# Patient Record
Sex: FEMALE | Race: WHITE | Employment: FULL TIME | URBAN - METROPOLITAN AREA
[De-identification: names, ages, dates, MRNs, and addresses within clinical notes are randomized per-mention and may not be internally consistent; named-entity substitution may affect disease eponyms.]

---

## 2017-05-08 ENCOUNTER — OFFICE VISIT (OUTPATIENT)
Dept: FAMILY MEDICINE CLINIC | Age: 47
End: 2017-05-08
Payer: COMMERCIAL

## 2017-05-08 VITALS
RESPIRATION RATE: 18 BRPM | OXYGEN SATURATION: 98 % | HEIGHT: 66 IN | DIASTOLIC BLOOD PRESSURE: 80 MMHG | SYSTOLIC BLOOD PRESSURE: 155 MMHG | TEMPERATURE: 98.2 F | WEIGHT: 126 LBS | HEART RATE: 68 BPM | BODY MASS INDEX: 20.25 KG/M2

## 2017-05-08 DIAGNOSIS — R63.4 WEIGHT LOSS: ICD-10-CM

## 2017-05-08 DIAGNOSIS — R10.13 EPIGASTRIC PAIN: ICD-10-CM

## 2017-05-08 DIAGNOSIS — R53.83 FATIGUE, UNSPECIFIED TYPE: ICD-10-CM

## 2017-05-08 DIAGNOSIS — R19.7 DIARRHEA, UNSPECIFIED TYPE: Primary | ICD-10-CM

## 2017-05-08 LAB
BILIRUBIN, POC: NEGATIVE
BLOOD URINE, POC: ABNORMAL
CLARITY, POC: CLEAR
COLOR, POC: YELLOW
GLUCOSE URINE, POC: NEGATIVE
KETONES, POC: NEGATIVE
LEUKOCYTE EST, POC: NEGATIVE
NITRITE, POC: NEGATIVE
PH, POC: 5
PROTEIN, POC: NEGATIVE
SPECIFIC GRAVITY, POC: 1.01
UROBILINOGEN, POC: NORMAL

## 2017-05-08 PROCEDURE — 81003 URINALYSIS AUTO W/O SCOPE: CPT | Performed by: FAMILY MEDICINE

## 2017-05-08 PROCEDURE — 99214 OFFICE O/P EST MOD 30 MIN: CPT | Performed by: FAMILY MEDICINE

## 2017-05-08 ASSESSMENT — ENCOUNTER SYMPTOMS
EYES NEGATIVE: 1
VOMITING: 1
ABDOMINAL PAIN: 1
DIARRHEA: 1
ALLERGIC/IMMUNOLOGIC NEGATIVE: 1
BLOATING: 1
FLATUS: 0
COUGH: 1
NAUSEA: 1

## 2017-05-10 ENCOUNTER — HOSPITAL ENCOUNTER (OUTPATIENT)
Age: 47
Discharge: HOME | End: 2017-05-10
Payer: COMMERCIAL

## 2017-05-10 DIAGNOSIS — R53.83 FATIGUE, UNSPECIFIED TYPE: ICD-10-CM

## 2017-05-10 DIAGNOSIS — R63.4 WEIGHT LOSS: ICD-10-CM

## 2017-05-10 DIAGNOSIS — R19.7 DIARRHEA, UNSPECIFIED TYPE: ICD-10-CM

## 2017-05-10 DIAGNOSIS — R10.13 EPIGASTRIC PAIN: ICD-10-CM

## 2017-05-10 LAB
ABSOLUTE EOS #: 0.1 K/UL (ref 0–0.4)
ABSOLUTE LYMPH #: 1.5 K/UL (ref 1–4.8)
ABSOLUTE MONO #: 0.5 K/UL (ref 0.1–1.2)
ALBUMIN SERPL-MCNC: 4.4 G/DL (ref 3.5–5.2)
ALBUMIN/GLOBULIN RATIO: 1.6 (ref 1–2.5)
ALP BLD-CCNC: 70 U/L (ref 35–104)
ALT SERPL-CCNC: 19 U/L (ref 5–33)
AMYLASE: 65 U/L (ref 28–100)
ANION GAP SERPL CALCULATED.3IONS-SCNC: 12 MMOL/L (ref 9–17)
AST SERPL-CCNC: 19 U/L
BASOPHILS # BLD: 0 %
BASOPHILS ABSOLUTE: 0 K/UL (ref 0–0.2)
BILIRUB SERPL-MCNC: 0.53 MG/DL (ref 0.3–1.2)
BUN BLDV-MCNC: 9 MG/DL (ref 6–20)
BUN/CREAT BLD: NORMAL (ref 9–20)
CALCIUM SERPL-MCNC: 9.3 MG/DL (ref 8.6–10.4)
CHLORIDE BLD-SCNC: 103 MMOL/L (ref 98–107)
CHOLESTEROL/HDL RATIO: 2.7
CHOLESTEROL: 155 MG/DL
CO2: 26 MMOL/L (ref 20–31)
CREAT SERPL-MCNC: 0.66 MG/DL (ref 0.5–0.9)
DIFFERENTIAL TYPE: NORMAL
EKG ATRIAL RATE: 54 BPM
EKG P AXIS: 79 DEGREES
EKG P-R INTERVAL: 148 MS
EKG Q-T INTERVAL: 410 MS
EKG QRS DURATION: 86 MS
EKG QTC CALCULATION (BAZETT): 388 MS
EKG R AXIS: 77 DEGREES
EKG T AXIS: 60 DEGREES
EKG VENTRICULAR RATE: 54 BPM
EOSINOPHILS RELATIVE PERCENT: 2 %
FERRITIN: 32 UG/L (ref 13–150)
FOLATE: 17.9 NG/ML
GFR AFRICAN AMERICAN: >60 ML/MIN
GFR NON-AFRICAN AMERICAN: >60 ML/MIN
GFR SERPL CREATININE-BSD FRML MDRD: NORMAL ML/MIN/{1.73_M2}
GFR SERPL CREATININE-BSD FRML MDRD: NORMAL ML/MIN/{1.73_M2}
GLUCOSE BLD-MCNC: 97 MG/DL (ref 70–99)
HCT VFR BLD CALC: 43.9 % (ref 36–46)
HDLC SERPL-MCNC: 57 MG/DL
HEMOGLOBIN: 15 G/DL (ref 12–16)
IRON SATURATION: 38 % (ref 20–55)
IRON: 113 UG/DL (ref 37–145)
LDL CHOLESTEROL: 87 MG/DL (ref 0–130)
LIPASE: 38 U/L (ref 13–60)
LYMPHOCYTES # BLD: 18 %
MCH RBC QN AUTO: 32.5 PG (ref 26–34)
MCHC RBC AUTO-ENTMCNC: 34.2 G/DL (ref 31–37)
MCV RBC AUTO: 94.8 FL (ref 80–100)
MONOCYTES # BLD: 6 %
PDW BLD-RTO: 13.4 % (ref 12.5–15.4)
PLATELET # BLD: 315 K/UL (ref 140–450)
PLATELET ESTIMATE: NORMAL
PMV BLD AUTO: 7.2 FL (ref 6–12)
POTASSIUM SERPL-SCNC: 4.5 MMOL/L (ref 3.7–5.3)
RBC # BLD: 4.63 M/UL (ref 4–5.2)
RBC # BLD: NORMAL 10*6/UL
SEG NEUTROPHILS: 74 %
SEGMENTED NEUTROPHILS ABSOLUTE COUNT: 6.5 K/UL (ref 1.8–7.7)
SODIUM BLD-SCNC: 141 MMOL/L (ref 135–144)
TOTAL IRON BINDING CAPACITY: 301 UG/DL (ref 250–450)
TOTAL PROTEIN: 7.2 G/DL (ref 6.4–8.3)
TRIGL SERPL-MCNC: 55 MG/DL
TSH SERPL DL<=0.05 MIU/L-ACNC: 1.91 MIU/L (ref 0.3–5)
UNSATURATED IRON BINDING CAPACITY: 188 UG/DL (ref 112–347)
VITAMIN B-12: 476 PG/ML (ref 211–946)
VLDLC SERPL CALC-MCNC: NORMAL MG/DL (ref 1–30)
WBC # BLD: 8.7 K/UL (ref 3.5–11)
WBC # BLD: NORMAL 10*3/UL

## 2017-05-10 PROCEDURE — 82728 ASSAY OF FERRITIN: CPT

## 2017-05-10 PROCEDURE — 82150 ASSAY OF AMYLASE: CPT

## 2017-05-10 PROCEDURE — 82607 VITAMIN B-12: CPT

## 2017-05-10 PROCEDURE — 83690 ASSAY OF LIPASE: CPT

## 2017-05-10 PROCEDURE — 80061 LIPID PANEL: CPT

## 2017-05-10 PROCEDURE — 82746 ASSAY OF FOLIC ACID SERUM: CPT

## 2017-05-10 PROCEDURE — 83550 IRON BINDING TEST: CPT

## 2017-05-10 PROCEDURE — 83540 ASSAY OF IRON: CPT

## 2017-05-10 PROCEDURE — 85025 COMPLETE CBC W/AUTO DIFF WBC: CPT

## 2017-05-10 PROCEDURE — 36415 COLL VENOUS BLD VENIPUNCTURE: CPT

## 2017-05-10 PROCEDURE — 93005 ELECTROCARDIOGRAM TRACING: CPT

## 2017-05-10 PROCEDURE — 80053 COMPREHEN METABOLIC PANEL: CPT

## 2017-05-10 PROCEDURE — 84443 ASSAY THYROID STIM HORMONE: CPT

## 2017-05-11 ENCOUNTER — HOSPITAL ENCOUNTER (OUTPATIENT)
Age: 47
Discharge: HOME | End: 2017-05-11
Payer: COMMERCIAL

## 2017-05-11 LAB
CAMPYLOBACTER PCR: NORMAL
SALMONELLA PCR: NORMAL
SHIGATOXIN GENE PCR: NORMAL
SHIGELLA SP PCR: NORMAL
SPECIMEN: NORMAL

## 2017-05-11 PROCEDURE — 87505 NFCT AGENT DETECTION GI: CPT

## 2019-08-20 ENCOUNTER — HOSPITAL ENCOUNTER (EMERGENCY)
Age: 49
Discharge: HOME | End: 2019-08-20
Attending: EMERGENCY MEDICINE
Payer: COMMERCIAL

## 2019-08-20 ENCOUNTER — APPOINTMENT (OUTPATIENT)
Dept: CT IMAGING | Age: 49
End: 2019-08-20
Payer: COMMERCIAL

## 2019-08-20 VITALS
TEMPERATURE: 97.3 F | SYSTOLIC BLOOD PRESSURE: 144 MMHG | DIASTOLIC BLOOD PRESSURE: 80 MMHG | RESPIRATION RATE: 18 BRPM | OXYGEN SATURATION: 98 % | HEART RATE: 75 BPM
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
## 2019-08-20 DIAGNOSIS — S16.1XXA STRAIN OF NECK MUSCLE, INITIAL ENCOUNTER: Primary | ICD-10-CM

## 2019-08-20 PROCEDURE — 72125 CT NECK SPINE W/O DYE: CPT

## 2019-08-20 PROCEDURE — 99284 EMERGENCY DEPT VISIT MOD MDM: CPT

## 2019-08-20 PROCEDURE — 70450 CT HEAD/BRAIN W/O DYE: CPT

## 2019-08-20 PROCEDURE — 6370000000 HC RX 637 (ALT 250 FOR IP): Performed by: STUDENT IN AN ORGANIZED HEALTH CARE EDUCATION/TRAINING PROGRAM

## 2019-08-20 RX ORDER — CYCLOBENZAPRINE HCL 10 MG
10 TABLET ORAL ONCE
Status: DISCONTINUED | OUTPATIENT
Start: 2019-08-20 | End: 2019-08-20 | Stop reason: HOSPADM

## 2019-08-20 RX ORDER — CYCLOBENZAPRINE HCL 5 MG
5 TABLET ORAL 2 TIMES DAILY PRN
Qty: 10 TABLET | Refills: 0 | Status: SHIPPED | OUTPATIENT
Start: 2019-08-20 | End: 2019-08-30

## 2019-08-20 ASSESSMENT — PAIN DESCRIPTION - FREQUENCY: FREQUENCY: CONTINUOUS

## 2019-08-20 ASSESSMENT — PAIN DESCRIPTION - LOCATION: LOCATION: LEG

## 2019-08-20 ASSESSMENT — ENCOUNTER SYMPTOMS
VOMITING: 0
SHORTNESS OF BREATH: 0
NAUSEA: 0
CHEST TIGHTNESS: 0
ABDOMINAL PAIN: 0

## 2019-08-20 ASSESSMENT — PAIN SCALES - GENERAL: PAINLEVEL_OUTOF10: 3

## 2019-08-20 ASSESSMENT — PAIN DESCRIPTION - DESCRIPTORS: DESCRIPTORS: ACHING

## 2019-08-20 ASSESSMENT — PAIN DESCRIPTION - ORIENTATION: ORIENTATION: RIGHT;LEFT

## 2019-08-20 NOTE — ED PROVIDER NOTES
significant for [place] Rendu. On exam she is uncomfortable, hypertensive, afebrile. GCS is 15. Neck has limited range of movement and has midline tenderness as well as paraspinous tenderness. Motor strength 5/5 in the upper extremities. Chest nontender. Abdomen soft nontender. Pelvis nontender. Full range of motion the hip. No bony tenderness to the femur knee or lower extremity. There is minimal tenderness of the right lateral hamstring. Impression is acute cervical strain rule out cervical injury, posttraumatic headache, right thigh pain muscular in etiology. Plan a CT head, CT neck, muscle relaxants, Ace wrap, ice, simple analgesics, routine follow-up if imaging is negative. Mert Graves.  Yenni Pastrana MD, [facility]  Attending Emergency  Physician                Yasmine Jean MD  08/20/19 1229    MIPS 12     A head CT was ordered, but not by an emergency care provider: Yes    A head CT was ordered by an emergency care provider, and some of the indications for ordering the head CT included  Measure Exclusions:  Patient has a ventricular shunt: No  Patient has a brain tumor: No  Patient has multi-system trauma: Yes  Patient is pregnant: No  Patient is taking an antiplatelet medication (excluding aspirin): No; [place]endu  Patient is 72years old or older: No    Signs and Symptoms:  Patients GCS was less than 15: No  Focal neurological deficit: No  Severe Headache: Yes  Vomiting: No  Physical signs of a basilar skull fracture: No  Coagulopathy: No  Thrombocytopenia: No  Patient suspected of taking an anticoagulant medication: No  Dangerous mechanism of injury: Yes           Yasmine Jean MD  08/20/19 0006

## 2020-05-28 ENCOUNTER — APPOINTMENT (OUTPATIENT)
Dept: CT IMAGING | Age: 50
End: 2020-05-28
Payer: COMMERCIAL

## 2020-05-28 ENCOUNTER — HOSPITAL ENCOUNTER (OUTPATIENT)
Age: 50
Setting detail: OBSERVATION
Discharge: HOME | End: 2020-05-29
Attending: EMERGENCY MEDICINE | Admitting: EMERGENCY MEDICINE
Payer: COMMERCIAL

## 2020-05-28 PROBLEM — R10.9 INTRACTABLE ABDOMINAL PAIN: Status: ACTIVE | Noted: 2020-05-28

## 2020-05-28 LAB
ABSOLUTE EOS #: 0.17 K/UL (ref 0–0.44)
ABSOLUTE IMMATURE GRANULOCYTE: <0.03 K/UL (ref 0–0.3)
ABSOLUTE LYMPH #: 1.1 K/UL (ref 1.1–3.7)
ABSOLUTE MONO #: 0.25 K/UL (ref 0.1–1.2)
ALBUMIN SERPL-MCNC: 4.8 G/DL (ref 3.5–5.2)
ALBUMIN/GLOBULIN RATIO: 1.7 (ref 1–2.5)
ALP BLD-CCNC: 60 U/L (ref 35–104)
ALT SERPL-CCNC: 9 U/L (ref 5–33)
ANION GAP SERPL CALCULATED.3IONS-SCNC: 15 MMOL/L (ref 9–17)
AST SERPL-CCNC: 19 U/L
BASOPHILS # BLD: 1 % (ref 0–2)
BASOPHILS ABSOLUTE: 0.05 K/UL (ref 0–0.2)
BILIRUB SERPL-MCNC: 0.67 MG/DL (ref 0.3–1.2)
BILIRUBIN URINE: NEGATIVE
BUN BLDV-MCNC: 6 MG/DL (ref 6–20)
BUN/CREAT BLD: ABNORMAL (ref 9–20)
CALCIUM SERPL-MCNC: 9.9 MG/DL (ref 8.6–10.4)
CHLORIDE BLD-SCNC: 103 MMOL/L (ref 98–107)
CO2: 22 MMOL/L (ref 20–31)
COLOR: YELLOW
COMMENT UA: ABNORMAL
CREAT SERPL-MCNC: 0.65 MG/DL (ref 0.5–0.9)
DIFFERENTIAL TYPE: ABNORMAL
EOSINOPHILS RELATIVE PERCENT: 2 % (ref 1–4)
GFR AFRICAN AMERICAN: >60 ML/MIN
GFR NON-AFRICAN AMERICAN: >60 ML/MIN
GFR SERPL CREATININE-BSD FRML MDRD: ABNORMAL ML/MIN/{1.73_M2}
GFR SERPL CREATININE-BSD FRML MDRD: ABNORMAL ML/MIN/{1.73_M2}
GLUCOSE BLD-MCNC: 101 MG/DL (ref 65–105)
GLUCOSE BLD-MCNC: 120 MG/DL (ref 70–99)
GLUCOSE URINE: NEGATIVE
HCG QUALITATIVE: NEGATIVE
HCT VFR BLD CALC: 46.7 % (ref 36.3–47.1)
HEMOGLOBIN: 15.7 G/DL (ref 11.9–15.1)
IMMATURE GRANULOCYTES: 0 %
KETONES, URINE: ABNORMAL
LEUKOCYTE ESTERASE, URINE: NEGATIVE
LYMPHOCYTES # BLD: 15 % (ref 24–43)
MCH RBC QN AUTO: 32.8 PG (ref 25.2–33.5)
MCHC RBC AUTO-ENTMCNC: 33.6 G/DL (ref 28.4–34.8)
MCV RBC AUTO: 97.7 FL (ref 82.6–102.9)
MONOCYTES # BLD: 3 % (ref 3–12)
NITRITE, URINE: NEGATIVE
NRBC AUTOMATED: 0 PER 100 WBC
PDW BLD-RTO: 12.7 % (ref 11.8–14.4)
PH UA: >9 (ref 5–8)
PLATELET # BLD: 356 K/UL (ref 138–453)
PLATELET ESTIMATE: ABNORMAL
PMV BLD AUTO: 9 FL (ref 8.1–13.5)
POTASSIUM SERPL-SCNC: 3.8 MMOL/L (ref 3.7–5.3)
PROTEIN UA: NEGATIVE
RBC # BLD: 4.78 M/UL (ref 3.95–5.11)
RBC # BLD: ABNORMAL 10*6/UL
SEG NEUTROPHILS: 79 % (ref 36–65)
SEGMENTED NEUTROPHILS ABSOLUTE COUNT: 5.91 K/UL (ref 1.5–8.1)
SODIUM BLD-SCNC: 140 MMOL/L (ref 135–144)
SPECIFIC GRAVITY UA: 1.05 (ref 1–1.03)
TOTAL PROTEIN: 7.6 G/DL (ref 6.4–8.3)
TURBIDITY: CLEAR
URINE HGB: NEGATIVE
UROBILINOGEN, URINE: NORMAL
WBC # BLD: 7.5 K/UL (ref 3.5–11.3)
WBC # BLD: ABNORMAL 10*3/UL

## 2020-05-28 PROCEDURE — 2580000003 HC RX 258

## 2020-05-28 PROCEDURE — 85025 COMPLETE CBC W/AUTO DIFF WBC: CPT

## 2020-05-28 PROCEDURE — 96361 HYDRATE IV INFUSION ADD-ON: CPT

## 2020-05-28 PROCEDURE — G0378 HOSPITAL OBSERVATION PER HR: HCPCS

## 2020-05-28 PROCEDURE — 99244 OFF/OP CNSLTJ NEW/EST MOD 40: CPT | Performed by: INTERNAL MEDICINE

## 2020-05-28 PROCEDURE — 96372 THER/PROPH/DIAG INJ SC/IM: CPT

## 2020-05-28 PROCEDURE — 96376 TX/PRO/DX INJ SAME DRUG ADON: CPT

## 2020-05-28 PROCEDURE — 96374 THER/PROPH/DIAG INJ IV PUSH: CPT

## 2020-05-28 PROCEDURE — 6360000002 HC RX W HCPCS: Performed by: EMERGENCY MEDICINE

## 2020-05-28 PROCEDURE — 2580000003 HC RX 258: Performed by: EMERGENCY MEDICINE

## 2020-05-28 PROCEDURE — 74177 CT ABD & PELVIS W/CONTRAST: CPT

## 2020-05-28 PROCEDURE — 84703 CHORIONIC GONADOTROPIN ASSAY: CPT

## 2020-05-28 PROCEDURE — 99285 EMERGENCY DEPT VISIT HI MDM: CPT

## 2020-05-28 PROCEDURE — 81003 URINALYSIS AUTO W/O SCOPE: CPT

## 2020-05-28 PROCEDURE — 80053 COMPREHEN METABOLIC PANEL: CPT

## 2020-05-28 PROCEDURE — 6360000002 HC RX W HCPCS

## 2020-05-28 PROCEDURE — 6360000004 HC RX CONTRAST MEDICATION: Performed by: EMERGENCY MEDICINE

## 2020-05-28 PROCEDURE — 82947 ASSAY GLUCOSE BLOOD QUANT: CPT

## 2020-05-28 RX ORDER — FENTANYL CITRATE 50 UG/ML
25 INJECTION, SOLUTION INTRAMUSCULAR; INTRAVENOUS
Status: DISCONTINUED | OUTPATIENT
Start: 2020-05-28 | End: 2020-05-28

## 2020-05-28 RX ORDER — DEXTROSE AND SODIUM CHLORIDE 5; .9 G/100ML; G/100ML
INJECTION, SOLUTION INTRAVENOUS
Status: DISCONTINUED
Start: 2020-05-28 | End: 2020-05-29

## 2020-05-28 RX ORDER — PROMETHAZINE HYDROCHLORIDE 25 MG/ML
12.5 INJECTION, SOLUTION INTRAMUSCULAR; INTRAVENOUS EVERY 8 HOURS PRN
Status: DISCONTINUED | OUTPATIENT
Start: 2020-05-28 | End: 2020-05-28

## 2020-05-28 RX ORDER — SODIUM CHLORIDE 0.9 % (FLUSH) 0.9 %
10 SYRINGE (ML) INJECTION PRN
Status: DISCONTINUED | OUTPATIENT
Start: 2020-05-28 | End: 2020-05-29 | Stop reason: HOSPADM

## 2020-05-28 RX ORDER — ACETAMINOPHEN 325 MG/1
650 TABLET ORAL EVERY 4 HOURS PRN
Status: DISCONTINUED | OUTPATIENT
Start: 2020-05-28 | End: 2020-05-29 | Stop reason: HOSPADM

## 2020-05-28 RX ORDER — SODIUM CHLORIDE 0.9 % (FLUSH) 0.9 %
10 SYRINGE (ML) INJECTION EVERY 12 HOURS SCHEDULED
Status: DISCONTINUED | OUTPATIENT
Start: 2020-05-28 | End: 2020-05-29 | Stop reason: HOSPADM

## 2020-05-28 RX ORDER — FENTANYL CITRATE 50 UG/ML
50 INJECTION, SOLUTION INTRAMUSCULAR; INTRAVENOUS
Status: DISCONTINUED | OUTPATIENT
Start: 2020-05-28 | End: 2020-05-28

## 2020-05-28 RX ORDER — HALOPERIDOL 5 MG/ML
5 INJECTION INTRAMUSCULAR ONCE
Status: COMPLETED | OUTPATIENT
Start: 2020-05-28 | End: 2020-05-28

## 2020-05-28 RX ORDER — 0.9 % SODIUM CHLORIDE 0.9 %
1000 INTRAVENOUS SOLUTION INTRAVENOUS ONCE
Status: COMPLETED | OUTPATIENT
Start: 2020-05-28 | End: 2020-05-28

## 2020-05-28 RX ORDER — ONDANSETRON 2 MG/ML
4 INJECTION INTRAMUSCULAR; INTRAVENOUS ONCE
Status: COMPLETED | OUTPATIENT
Start: 2020-05-28 | End: 2020-05-28

## 2020-05-28 RX ORDER — PROMETHAZINE HYDROCHLORIDE 25 MG/ML
12.5 INJECTION, SOLUTION INTRAMUSCULAR; INTRAVENOUS EVERY 8 HOURS PRN
Status: DISCONTINUED | OUTPATIENT
Start: 2020-05-28 | End: 2020-05-29 | Stop reason: HOSPADM

## 2020-05-28 RX ORDER — CAPSAICIN 0.025 %
CREAM (GRAM) TOPICAL 2 TIMES DAILY PRN
Status: DISCONTINUED | OUTPATIENT
Start: 2020-05-28 | End: 2020-05-29 | Stop reason: HOSPADM

## 2020-05-28 RX ORDER — ONDANSETRON 2 MG/ML
INJECTION INTRAMUSCULAR; INTRAVENOUS
Status: COMPLETED
Start: 2020-05-28 | End: 2020-05-28

## 2020-05-28 RX ORDER — MORPHINE SULFATE 2 MG/ML
2 INJECTION, SOLUTION INTRAMUSCULAR; INTRAVENOUS
Status: DISCONTINUED | OUTPATIENT
Start: 2020-05-28 | End: 2020-05-29

## 2020-05-28 RX ORDER — ONDANSETRON 2 MG/ML
4 INJECTION INTRAMUSCULAR; INTRAVENOUS EVERY 8 HOURS PRN
Status: DISCONTINUED | OUTPATIENT
Start: 2020-05-28 | End: 2020-05-29 | Stop reason: HOSPADM

## 2020-05-28 RX ADMIN — SODIUM CHLORIDE 1000 ML: 9 INJECTION, SOLUTION INTRAVENOUS at 11:13

## 2020-05-28 RX ADMIN — ONDANSETRON 4 MG: 2 INJECTION INTRAMUSCULAR; INTRAVENOUS at 11:13

## 2020-05-28 RX ADMIN — IOHEXOL 75 ML: 350 INJECTION, SOLUTION INTRAVENOUS at 12:28

## 2020-05-28 RX ADMIN — DEXTROSE AND SODIUM CHLORIDE: 5; 900 INJECTION, SOLUTION INTRAVENOUS at 15:20

## 2020-05-28 RX ADMIN — ONDANSETRON 4 MG: 2 INJECTION INTRAMUSCULAR; INTRAVENOUS at 11:48

## 2020-05-28 RX ADMIN — HALOPERIDOL LACTATE 5 MG: 5 INJECTION, SOLUTION INTRAMUSCULAR at 12:02

## 2020-05-28 ASSESSMENT — PAIN DESCRIPTION - PAIN TYPE
TYPE: ACUTE PAIN
TYPE: ACUTE PAIN

## 2020-05-28 ASSESSMENT — ENCOUNTER SYMPTOMS
SHORTNESS OF BREATH: 0
COUGH: 0
RHINORRHEA: 0
ABDOMINAL PAIN: 1
BACK PAIN: 0
EYE REDNESS: 0
NAUSEA: 1
SORE THROAT: 0
VOMITING: 1
EYE PAIN: 0
DIARRHEA: 1

## 2020-05-28 ASSESSMENT — PAIN DESCRIPTION - FREQUENCY: FREQUENCY: INTERMITTENT

## 2020-05-28 ASSESSMENT — PAIN DESCRIPTION - ORIENTATION
ORIENTATION: RIGHT;MID
ORIENTATION: RIGHT;MID;LOWER

## 2020-05-28 ASSESSMENT — PAIN DESCRIPTION - LOCATION
LOCATION: ABDOMEN
LOCATION: ABDOMEN

## 2020-05-28 ASSESSMENT — PAIN SCALES - GENERAL
PAINLEVEL_OUTOF10: 2
PAINLEVEL_OUTOF10: 5

## 2020-05-28 ASSESSMENT — PAIN DESCRIPTION - DESCRIPTORS: DESCRIPTORS: DISCOMFORT;ACHING

## 2020-05-28 NOTE — ED NOTES
Pt. Arrived to the ED c/o ongoing intermittent  N/V/D and RLQ pain since Feb. Patients intermittently smokes Marijuana. Denies ETOH, fever, vaginal, or urinary complaints. Pt. Placed on continuous monitoring.       Karthik Petersen RN  05/28/20 [address], RN  05/28/20 1535

## 2020-05-28 NOTE — CARE COORDINATION
Case Management Initial Discharge Plan  Ben Cunningham,             Met with:patient to discuss discharge plans. Information verified: address, contacts, phone number, , insurance Yes  Emergency Contact/Next of Kin name & number:   PCP: No primary care provider on file. Date of last visit: provided [phone], to find a MD accepting new patients. Insurance Provider: KASH    Discharge Planning    Living Arrangements:  Spouse/Significant Other   Support Systems:  Spouse/Significant Other    Home has 2 stories  5 stairs to climb to get into front door, 1 flight stairs to climb to reach second floor  Location of bedroom/bathroom in home bath and bedroom on the 1st and 2nd floor. Patient able to perform ADL's:Independent    Current Services (outpatient & in home) none  DME equipment: none  DME provider:       Potential Assistance Needed:  N/A    Patient agreeable to home care: No  [place] of choice provided:  n/a    Prior SNF/Rehab Placement and Facility:   Agreeable to SNF/Rehab: No  [place] of choice provided: n/a   Evaluation: no    Expected Discharge date:  20  Patient expects to be discharged to:  return to home  Follow Up Appointment: Best Day/ Time:      Transportation provider: self  Transportation arrangements needed for discharge: No,  will transport. Readmission Risk              Risk of Unplanned Readmission:        0             Does patient have a readmission risk score greater than 14?: not calculated. If yes, follow-up appointment must be made within 7 days of discharge. Goals of Care: decreased nausea.       Discharge Plan: return to home, no needs identified          Electronically signed by Haim Sharma RN on 20 at 6:05 PM EDT

## 2020-05-28 NOTE — ED PROVIDER NOTES
I did not see, evaluate, or staff this patient. Patient seen by Dr. Aries Khan.        Rome Demarco MD  05/28/20 7391
[facility]  Emergency Department  Emergency Medicine Resident Sign-out     Care of Kati Lara was assumed from Dr. Isidoro Issa and is being seen for Emesis (ongoing intermittent N/V/D and RLQ pain. Denies urinary/vaginal complaints. Denies cough, fever/chills, no chest pain/SOB)  . The patient's initial evaluation and plan have been discussed with the prior provider who initially evaluated the patient.      EMERGENCY DEPARTMENT COURSE / MEDICAL DECISION MAKING:       MEDICATIONS GIVEN:  Orders Placed This Encounter   Medications    ondansetron (ZOFRAN) 4 MG/2ML injection     SHAHEEN ELON: cabinet override    ondansetron (ZOFRAN) injection 4 mg    0.9 % sodium chloride bolus    ondansetron (ZOFRAN) injection 4 mg    haloperidol lactate (HALDOL) injection 5 mg    iohexol (OMNIPAQUE 350) solution 75 mL    dextrose 5 % and 0.9 % NaCl 1,000 mL infusion    dextrose 5 % and 0.9 % NaCl 5-0.9 % infusion     SHAHEEN LEON: cabinet override    sodium chloride flush 0.9 % injection 10 mL    sodium chloride flush 0.9 % injection 10 mL    acetaminophen (TYLENOL) tablet 650 mg    enoxaparin (LOVENOX) injection 40 mg    DISCONTD: fentaNYL (SUBLIMAZE) injection 25 mcg    DISCONTD: fentaNYL (SUBLIMAZE) injection 50 mcg    ondansetron (ZOFRAN) injection 4 mg    DISCONTD: promethazine (PHENERGAN) injection 12.5 mg    capsaicin (ZOSTRIX) 0.025 % cream    promethazine (PHENERGAN) injection 12.5 mg    morphine (PF) injection 2 mg       LABS / RADIOLOGY:     Labs Reviewed   CBC WITH AUTO DIFFERENTIAL - Abnormal; Notable for the following components:       Result Value    Hemoglobin 15.7 (*)     Seg Neutrophils 79 (*)     Lymphocytes 15 (*)     All other components within normal limits   COMPREHENSIVE METABOLIC PANEL W/ REFLEX TO MG FOR LOW K - Abnormal; Notable for the following components:    Glucose 120 (*)     All other components within normal limits   URINALYSIS - Abnormal; Notable for the following
possible appendicitis. CRITICAL CARE:       PROCEDURES:    Procedures    DIAGNOSTIC RESULTS   EKG:All EKG's are interpreted by the Emergency Department Physician who either signs or Co-signs this chart in the absence of a cardiologist.      RADIOLOGY:All plain film, CT, MRI, and formal ultrasound images (except ED bedside ultrasound) are read by the radiologist, see reports below, unless otherwisenoted in MDM or here. CT ABDOMEN PELVIS W IV CONTRAST   Preliminary Result   Equivocal findings of early acute appendicitis with minimal inflammatory   changes in the right lower quadrant but an air-filled appendix. Please   correlate with physical exam and lab values. LABS: All lab results were reviewed by myself, and all abnormals are listed below.   Labs Reviewed   CBC WITH AUTO DIFFERENTIAL - Abnormal; Notable for the following components:       Result Value    Hemoglobin 15.7 (*)     Seg Neutrophils 79 (*)     Lymphocytes 15 (*)     All other components within normal limits   COMPREHENSIVE METABOLIC PANEL W/ REFLEX TO MG FOR LOW K - Abnormal; Notable for the following components:    Glucose 120 (*)     All other components within normal limits   URINALYSIS - Abnormal; Notable for the following components:    Ketones, Urine MODERATE (*)     Specific Gravity, UA 1.055 (*)     pH, UA >9.0 (*)     All other components within normal limits   HCG, SERUM, QUALITATIVE     EMERGENCY DEPARTMENTCOURSE:   Vitals:    Vitals:    05/28/20 1057 05/28/20 1216 05/28/20 1301 05/28/20 1340   BP: 128/71 134/71 138/74    Pulse: 58   70   Resp: 17      Temp:       TempSrc:       SpO2: 100% 99% 98%    Weight: 135 lb (61.2 kg)          The patient was given the following medications while in the emergency department:  Orders Placed This Encounter   Medications    ondansetron (ZOFRAN) 4 MG/2ML injection     SHAHEEN LEON: cabinet override    ondansetron (ZOFRAN) injection 4 mg    0.9 % sodium chloride bolus    ondansetron

## 2020-05-29 VITALS
WEIGHT: 137.7 LBS | TEMPERATURE: 99 F | HEIGHT: 66 IN | SYSTOLIC BLOOD PRESSURE: 120 MMHG | DIASTOLIC BLOOD PRESSURE: 72 MMHG | OXYGEN SATURATION: 98 % | HEART RATE: 64 BPM | BODY MASS INDEX: 22.13 KG/M2 | RESPIRATION RATE: 16 BRPM

## 2020-05-29 LAB
ABSOLUTE EOS #: 0.14 K/UL (ref 0–0.44)
ABSOLUTE IMMATURE GRANULOCYTE: 0.03 K/UL (ref 0–0.3)
ABSOLUTE LYMPH #: 1.38 K/UL (ref 1.1–3.7)
ABSOLUTE MONO #: 0.52 K/UL (ref 0.1–1.2)
ANION GAP SERPL CALCULATED.3IONS-SCNC: 14 MMOL/L (ref 9–17)
BASOPHILS # BLD: 0 % (ref 0–2)
BASOPHILS ABSOLUTE: 0.04 K/UL (ref 0–0.2)
BUN BLDV-MCNC: 6 MG/DL (ref 6–20)
BUN/CREAT BLD: ABNORMAL (ref 9–20)
CALCIUM SERPL-MCNC: 8.4 MG/DL (ref 8.6–10.4)
CHLORIDE BLD-SCNC: 109 MMOL/L (ref 98–107)
CO2: 19 MMOL/L (ref 20–31)
CREAT SERPL-MCNC: 0.57 MG/DL (ref 0.5–0.9)
DIFFERENTIAL TYPE: ABNORMAL
EOSINOPHILS RELATIVE PERCENT: 1 % (ref 1–4)
GFR AFRICAN AMERICAN: >60 ML/MIN
GFR NON-AFRICAN AMERICAN: >60 ML/MIN
GFR SERPL CREATININE-BSD FRML MDRD: ABNORMAL ML/MIN/{1.73_M2}
GFR SERPL CREATININE-BSD FRML MDRD: ABNORMAL ML/MIN/{1.73_M2}
GLUCOSE BLD-MCNC: 125 MG/DL (ref 70–99)
HCT VFR BLD CALC: 40 % (ref 36.3–47.1)
HEMOGLOBIN: 12.9 G/DL (ref 11.9–15.1)
IMMATURE GRANULOCYTES: 0 %
LYMPHOCYTES # BLD: 14 % (ref 24–43)
MCH RBC QN AUTO: 32.5 PG (ref 25.2–33.5)
MCHC RBC AUTO-ENTMCNC: 32.3 G/DL (ref 28.4–34.8)
MCV RBC AUTO: 100.8 FL (ref 82.6–102.9)
MONOCYTES # BLD: 5 % (ref 3–12)
NRBC AUTOMATED: 0 PER 100 WBC
PDW BLD-RTO: 12.8 % (ref 11.8–14.4)
PLATELET # BLD: 285 K/UL (ref 138–453)
PLATELET ESTIMATE: ABNORMAL
PMV BLD AUTO: 9 FL (ref 8.1–13.5)
POTASSIUM SERPL-SCNC: 3.4 MMOL/L (ref 3.7–5.3)
RBC # BLD: 3.97 M/UL (ref 3.95–5.11)
RBC # BLD: ABNORMAL 10*6/UL
SEG NEUTROPHILS: 79 % (ref 36–65)
SEGMENTED NEUTROPHILS ABSOLUTE COUNT: 7.81 K/UL (ref 1.5–8.1)
SODIUM BLD-SCNC: 142 MMOL/L (ref 135–144)
WBC # BLD: 9.9 K/UL (ref 3.5–11.3)
WBC # BLD: ABNORMAL 10*3/UL

## 2020-05-29 PROCEDURE — G0378 HOSPITAL OBSERVATION PER HR: HCPCS

## 2020-05-29 PROCEDURE — 99231 SBSQ HOSP IP/OBS SF/LOW 25: CPT | Performed by: INTERNAL MEDICINE

## 2020-05-29 PROCEDURE — 85025 COMPLETE CBC W/AUTO DIFF WBC: CPT

## 2020-05-29 PROCEDURE — 80048 BASIC METABOLIC PNL TOTAL CA: CPT

## 2020-05-29 PROCEDURE — APPNB45 APP NON BILLABLE 31-45 MINUTES: Performed by: INTERNAL MEDICINE

## 2020-05-29 PROCEDURE — 96361 HYDRATE IV INFUSION ADD-ON: CPT

## 2020-05-29 PROCEDURE — 36415 COLL VENOUS BLD VENIPUNCTURE: CPT

## 2020-05-29 PROCEDURE — 6370000000 HC RX 637 (ALT 250 FOR IP): Performed by: STUDENT IN AN ORGANIZED HEALTH CARE EDUCATION/TRAINING PROGRAM

## 2020-05-29 RX ORDER — PANTOPRAZOLE SODIUM 40 MG/1
40 TABLET, DELAYED RELEASE ORAL
Status: DISCONTINUED | OUTPATIENT
Start: 2020-05-29 | End: 2020-05-29 | Stop reason: HOSPADM

## 2020-05-29 RX ORDER — ONDANSETRON 4 MG/1
4 TABLET, ORALLY DISINTEGRATING ORAL EVERY 8 HOURS PRN
Qty: 20 TABLET | Refills: 0 | Status: SHIPPED | OUTPATIENT
Start: 2020-05-29 | End: 2020-06-10

## 2020-05-29 RX ORDER — PANTOPRAZOLE SODIUM 40 MG/1
40 TABLET, DELAYED RELEASE ORAL DAILY
Qty: 30 TABLET | Refills: 0 | Status: SHIPPED | OUTPATIENT
Start: 2020-05-29 | End: 2020-06-10

## 2020-05-29 RX ADMIN — PANTOPRAZOLE SODIUM 40 MG: 40 TABLET, DELAYED RELEASE ORAL at 09:26

## 2020-05-29 NOTE — CONSULTS
Talks on phone: Not on file     Gets together: Not on file     Attends Jew service: Not on file     Active member of club or organization: Not on file     Attends meetings of clubs or organizations: Not on file     Relationship status: Not on file    Intimate partner violence     Fear of current or ex partner: Not on file     Emotionally abused: Not on file     Physically abused: Not on file     Forced sexual activity: Not on file   Other Topics Concern    Not on file   Social History Narrative    Not on file       Family History:   History reviewed. No pertinent family history. REVIEW OF SYSTEMS:    CONSTITUTIONAL: Denies recent weight loss, fatigue, fevers, chills. HEENT: Denies rhinorrhea, dysphagia, odynphagia. CARDIOVASCULAR: Denies history of MI, recent chest pain. RESPIRATORY: Denies recent history of shortness of breath or history of PE. GASTROINTESTINAL: see hpi  GENITOURINARY: Denies increased frequency or dysuria. HEMATOLOGIC/LYMPHATIC: Denies history of anemia or DVTs. ENDOCRINE: Denies history of thyroid problems or diabetes. NEURO: Denies history of CVA, TIA. Review of systems negative unless listed above. PHYSICAL EXAM:    VITALS:  /74   Pulse 72   Temp 97.7 °F (36.5 °C) (Oral)   Resp 16   Ht 5' 6\" (1.676 m)   Wt 137 lb 11.2 oz (62.5 kg)   LMP 05/07/2020 (Approximate)   SpO2 96%   BMI 22.23 kg/m²   INTAKE/OUTPUT:     Intake/Output Summary (Last 24 hours) at 5/29/2020 0105  Last data filed at 5/28/2020 1243  Gross per 24 hour   Intake 1000 ml   Output --   Net 1000 ml       CONSTITUTIONAL:  awake, alert, not distressed and normal weight  HEENT: Normocephalic/atraumatic, without obvious abnormality. NECK:  Supple, symmetrical, trachea midline   CARDIOVASCULAR: s1+s2  LUNGS: equal and symmetric chest rise/fall, non-labored  ABDOMEN: soft, nd, minimal tenderness to palpation mike-umbilical. No mcburney point tenderness.  No rebound or guarding    MUSCULOSKELETAL: adrenal nodules. Air-filled appendix. Minimal inflammatory change in the right lower quadrant. Findings equivocal for early acute appendicitis. Please correlate clinically. No evidence of bowel obstruction. Free fluid in the pelvis likely physiologic. Bladder wall is mildly thickened. Please correlate for cystitis. Normal caliber aorta. No suspicious retroperitoneal lymph nodes. No acute osseous abnormality. Age compatible degenerative change. Equivocal findings of early acute appendicitis with minimal inflammatory changes in the right lower quadrant but an air-filled appendix. Please correlate with physical exam and lab values. ASSESSMENT:  Active Hospital Problems    Diagnosis Date Noted    Intractable abdominal pain [R10.9] 05/28/2020       1. 53 Yo F with several months h/o n/v/d. Plan:  1. Continue medical mgmt and supportive care per primary  2. CT reviewed, no hard evidence of acute appendicitis, especially when combined with history and physical exam. Give history chronic marijuana use that coorilates with chronic nausea/vomiting patient most likely with cannabis hyperemesis syndrome. Rec stopping recreational drug use. No acute surgical intervention. General surgery to sign off. Electronically signed by Cori Issa DO  on 5/29/2020 at 1:05 AM   I attest that I was present in the [address] [place] [address] emergency department with the resident during the patient's history and physical examination and agree with the findings and plan as outlined above.   Aubrey Sow MD

## 2020-05-29 NOTE — CONSULTS
Normocephalic, Atraumatic  EENT:   EOMI, Sclera not icteric, Oropharynx moist   NECK:   Supple, Trachea midline  LUNGS:  CTA Bilaterally  HEART:  RRR, No murmur  ABDOMEN:   Soft, mild RLQ tenderness, Nondistended, BS WNL  EXT:   No clubbing. No cyanosis. No edema. SKIN:   No rashes. No jaundice. No stigmata of liver disease. MUSC/SKEL:   Adequate muscle bulk for patient's age, No significant synovitis, No deformities  NEURO:  A&O x Three, CN II- XII grossly intact      LABS AND IMAGING:     CBC  Recent Labs     05/28/20  1119   WBC 7.5   HGB 15.7*   HCT 46.7   MCV 97.7   MCH 32.8   MCHC 33.6          BMP  Recent Labs     05/28/20  1119      K 3.8      CO2 22   BUN 6   CREATININE 0.65   GLUCOSE 120*   CALCIUM 9.9       LFTS  Recent Labs     05/28/20  1119   ALKPHOS 60   ALT 9   AST 19   PROT 7.6   BILITOT 0.67   LABALBU 4.8       AMYLASE/LIPASE/AMMONIA  No results for input(s): AMYLASE, LIPASE, AMMONIA in the last 72 hours. PT/INR  No results for input(s): PROTIME, INR in the last 72 hours. ANEMIA STUDIES  No results for input(s): IRON, LABIRON, TIBC, UIBC, FERRITIN, IODKIELT07, FOLATE, OCCULTBLD in the last 72 hours. IMAGING  Ct Abdomen Pelvis W Iv Contrast    Result Date: 5/28/2020  EXAMINATION: CT OF THE ABDOMEN AND PELVIS WITH CONTRAST 5/28/2020 12:19 pm TECHNIQUE: CT of the abdomen and pelvis was performed with the administration of intravenous contrast. Multiplanar reformatted images are provided for review. Dose modulation, iterative reconstruction, and/or weight based adjustment of the mA/kV was utilized to reduce the radiation dose to as low as reasonably achievable. COMPARISON: None HISTORY: ORDERING SYSTEM PROVIDED HISTORY: RLQ pain, emesis TECHNOLOGIST PROVIDED HISTORY: IV contrast only RLQ pain, emesis Reason for Exam: abdomin pain Acuity: Unknown Type of Exam: Unknown FINDINGS: Lung bases are clear. Fatty liver. No suspicious lesions. Normal spleen.   Nondistended gallbladder. No peripancreatic inflammatory changes. No hydroureteronephrosis or perinephric collections. No suspicious adrenal nodules. Air-filled appendix. Minimal inflammatory change in the right lower quadrant. Findings equivocal for early acute appendicitis. Please correlate clinically. No evidence of bowel obstruction. Free fluid in the pelvis likely physiologic. Bladder wall is mildly thickened. Please correlate for cystitis. Normal caliber aorta. No suspicious retroperitoneal lymph nodes. No acute osseous abnormality. Age compatible degenerative change. Equivocal findings of early acute appendicitis with minimal inflammatory changes in the right lower quadrant but an air-filled appendix. Please correlate with physical exam and lab values. IMPRESSION/Plan :     55 y/o female with a history of HHT, tobacco and marijuana use disorder presented to the hospital with complaint intermittent nausea, vomiting, retching with blood-tinged emesis and intermittent diarrhea. GI consulted for intractable nausea, vomiting, and persistent blood-tinged emesis. Intermittent N/V - suspect cannabis hyperemesis use disorder. No signs of GOO on imaging. No hx of DM  -Recommend supportive care with dehydration, antiemetics (Zofran), and possible benzos  - Recommend marijuana cessation - discussed with patient    Blood tinged emesis - suspect low grade Alanna-Giron tear vs esophagitis. Her hx of HHT maybe a contributing factor.   - No plans for endoscopy at this time as EGD would likely be low yield. Patient also refusing EGD.   - Recommend PPI  - Cl diet and Ok to advance as tolerated from GI perspective       This plan was formulated in collaboration with Cata Avalos  Thank you for allowing us to participate in the care of your patient.     Electronically signed by: Gaetano GUTIERREZ on 5/28/2020 at 8:08 PM     Please note that this note was generated using a voice recognition dictation

## 2020-05-29 NOTE — H&P
[facility]  CDU / OBSERVATION eNCOUnter  Resident Note     Pt Name: Goyo Martines  MRN: [id number]  [place] 1970  Date of evaluation: 5/29/20  Patient's PCP is : No primary care provider on file. CHIEF COMPLAINT       Chief Complaint   Patient presents with    Emesis     ongoing intermittent N/V/D and RLQ pain. Denies urinary/vaginal complaints. Denies cough, fever/chills, no chest pain/SOB         HISTORY OF PRESENT ILLNESS    Goyo Martines is a 54 y.o. female who presented to the emergency department with nausea, vomiting, diarrhea. Patient reports having 1 day of severe nausea and vomiting, associated with abdominal pain, described as sharp constant epigastric pain, nonradiating, worse with vomiting. Patient denies fevers, cough, congestion, chest pain, lightheadedness, shortness of breath, dysuria, vaginal discharge, vaginal bleeding, hematuria, melena, hematochezia. Patient has history of hyperemesis in the past, uses marijuana regularly, HHT. Patient has no past surgical history. In the emergency department, patient had stable vital signs, ketones in her urine, but otherwise normal labs, CT significant for equivocal findings of early acute appendicitis. Administered IV fluids, IV Zofran, IV Haldol, with minimal improvement in symptoms. General surgery was consulted, low concern for acute appendicitis. GI consulted, recommend supportive cares. Patient admitted to the observation unit for symptomatic management. This morning, patient has resolution of symptoms, able to tolerate diet.      Location/Symptom: Epigastric  Timing/Onset: 1 day  Provocation: Vomiting  Quality: Sharp  Radiation: Nonradiating  Severity: Moderate  Timing/Duration: Intermittent    Modifying Factors: Unknown    REVIEW OF SYSTEMS       General ROS - No fevers, No malaise   Ophthalmic ROS - No discharge, No changes in vision  ENT ROS -  No sore throat, No rhinorrhea,   Respiratory ROS - no shortness of with IM Haldol, IV Zofran, IV fluids. GI consulted, appreciate recommendations. General surgery consulted, appreciate recommendations. 1. Discharge home with prescriptions for oral Protonix and oral Zofran    ·   · IP CONSULT TO GENERAL SURGERY  · IP CONSULT TO GI  · Further workup and evaluation   · Follow up recommendations     · Continue home meds, pain control   · Monitor vitals, labs, imaging         CONSULTS:    IP CONSULT TO GENERAL SURGERY  IP CONSULT TO GI    PROCEDURES:  Not indicated       PATIENT REFERRED TO:    No follow-up provider specified. --  Kely King MD   Emergency Medicine Resident     This dictation was generated by voice recognition computer software. Although all attempts are made to edit the dictation for accuracy, there may be errors in the transcription that are not intended.

## 2020-05-29 NOTE — PROGRESS NOTES
[facility] [facility]  CDU / OBSERVATION eNCOUnter  Attending NOte       I performed a history and physical examination of the patient and discussed management with the resident. I reviewed the residents note and agree with the documented findings and plan of care. Any areas of disagreement are noted on the chart. I was personally present for the key portions of any procedures. I have documented in the chart those procedures where I was not present during the key portions. I have reviewed the nurses notes. I agree with the chief complaint, past medical history, past surgical history, allergies, medications, social and family history as documented unless otherwise noted below. The Family history, social history, and ROS are effectively unchanged since admission unless noted elsewhere in the chart. Pt presented to the ED with R sided abd pain, n/v. CT in ED was equivocal for possible early appendicitis. Surgery was consulted and felt this unlikely. Pt does admit to cannabis use and surgery felt hyperemesis more likely. GI also consulted and EGD offered but pt is declined. Pt says she is feeling much better this morning. She denies any pain and has been able to tolerate fluids and soft foods. She states she would like to be discharged. Pt does appear well and abd is soft and nontender. She denies fever, chills, chest pain, sob, cough. Will plan to d/c today. Patient smokes 1 pack of cigarettes per day for 30 years. Smoking cessation counseling for 3 minutes. Discussed the effects of smoking in regards to overall health. I explained how this negatively effects their condition, will contribute to increased recovery time, and possible lead to further health problems in the future.           Susan Dacosta MD  Attending Emergency  Physician
equivocal for early acute appendicitis. Please correlate clinically. No evidence of bowel obstruction. Free fluid in the pelvis likely physiologic. Bladder wall is mildly thickened. Please correlate for cystitis. Normal caliber aorta. No suspicious retroperitoneal lymph nodes. No acute osseous abnormality. Age compatible degenerative change. Equivocal findings of early acute appendicitis with minimal inflammatory changes in the right lower quadrant but an air-filled appendix. Please correlate with physical exam and lab values. ENDOSCOPY     Active Problems:    Intractable abdominal pain  Resolved Problems:    * No resolved hospital problems. *       GI Assessment:    Intermittent N/V - Resolved  - suspect cannabis hyperemesis use disorder. Blood tinged emesis - suspect low grade Alanna-Giron tear vs esophagitis. Her hx of HHT maybe a contributing factor. Plan of care:  1. Diet as tolerated. 2. Patient can be discharged home if patient she tolerates diet. 3. Recommend marijuana cessation   4. She can follow up with with GI PRN    Please feel free to contact me with any questions or concerns. Thank you for allowing me to participate in the care of your patient. Ja Chance, APRN - CNP on 5/29/2020 at [address] [facility]    Please note that this note was generated using a voice recognition dictation software. Although every effort was made to ensure the accuracy of this automated transcription, some errors in transcription may have occurred.

## 2020-05-29 NOTE — FLOWSHEET NOTE
05/29/20 0900   Encounter Summary   Services provided to: Patient   Support System Family members   Continue Visiting No   Complexity of Encounter Low   Length of Encounter 15 minutes   Spiritual Assessment Completed Yes   Routine   Type Initial   Spiritual/Rastafari   Type Spiritual support   Assessment Calm; Approachable; Hopeful   Intervention Active listening;Nurtured hope;Prayer;Empowerment   Outcome Expressed gratitude

## 2020-05-29 NOTE — DISCHARGE SUMMARY
CDU Discharge Summary        Patient:  Amy Montana  YOB: 1970    MRN: [id number]   Acct: [de-identified]    Primary Care Physician: No primary care provider on file. Admit date:  5/28/2020 10:45 AM  Discharge date: 5/29/2020 12:00 PM     Discharge Diagnoses:     1.) Acute onset nausea and vomiting, associated with epigastric pain and diarrhea, etiology uncertain. Patient presented to the emergency department with nausea and vomiting, associated with epigastric pain and diarrhea. Unable to control symptoms in the emergency department, CT showed possible appendicitis. General surgery consulted, very low concern for acute appendicitis. GI consulted, recommended symptomatic management. Patient's symptoms resolved with IM Haldol, IV Zofran, IV fluids, patient was tolerating oral diet. Patient remained stable, ready for discharge home. Patient has no PCP, provided list, will follow-up with a primary care provider. Follow-up:  Call today/tomorrow for a follow up appointment with primary care provider, or return to the Emergency Room with worsening symptoms    Stressed to patient the importance of following up with primary care doctor for further workup/management of symptoms. Pt verbalizes understanding and agrees with plan.     Discharge Medication Changes:       Medication List      START taking these medications    ondansetron 4 MG disintegrating tablet  Commonly known as:  Zofran ODT  Take 1 tablet by mouth every 8 hours as needed for Nausea     pantoprazole 40 MG tablet  Commonly known as:  PROTONIX  Take 1 tablet by mouth daily           Where to Get Your Medications      These medications were sent to [facility] [address] [facility], [address] [place] [address]  [address] [facility], [facility] [address]    Phone:  [phone]   · ondansetron 4 MG disintegrating tablet  · pantoprazole 40 MG tablet         Diet:  No diet orders on file, advance as tolerated

## 2020-06-10 ENCOUNTER — TELEPHONE (OUTPATIENT)
Dept: ONCOLOGY | Age: 50
End: 2020-06-10

## 2020-06-10 ENCOUNTER — OFFICE VISIT (OUTPATIENT)
Dept: PRIMARY CARE CLINIC | Age: 50
End: 2020-06-10
Payer: COMMERCIAL

## 2020-06-10 VITALS
BODY MASS INDEX: 21.05 KG/M2 | DIASTOLIC BLOOD PRESSURE: 74 MMHG | OXYGEN SATURATION: 97 % | WEIGHT: 131 LBS | TEMPERATURE: 98.2 F | HEIGHT: 66 IN | HEART RATE: 100 BPM | SYSTOLIC BLOOD PRESSURE: 121 MMHG

## 2020-06-10 PROCEDURE — 1111F DSCHRG MED/CURRENT MED MERGE: CPT | Performed by: NURSE PRACTITIONER

## 2020-06-10 PROCEDURE — 99203 OFFICE O/P NEW LOW 30 MIN: CPT | Performed by: NURSE PRACTITIONER

## 2020-06-10 SDOH — ECONOMIC STABILITY: FOOD INSECURITY: WITHIN THE PAST 12 MONTHS, YOU WORRIED THAT YOUR FOOD WOULD RUN OUT BEFORE YOU GOT MONEY TO BUY MORE.: NEVER TRUE

## 2020-06-10 SDOH — ECONOMIC STABILITY: FOOD INSECURITY: WITHIN THE PAST 12 MONTHS, THE FOOD YOU BOUGHT JUST DIDN'T LAST AND YOU DIDN'T HAVE MONEY TO GET MORE.: NEVER TRUE

## 2020-06-10 SDOH — ECONOMIC STABILITY: INCOME INSECURITY: HOW HARD IS IT FOR YOU TO PAY FOR THE VERY BASICS LIKE FOOD, HOUSING, MEDICAL CARE, AND HEATING?: NOT HARD AT ALL

## 2020-06-10 ASSESSMENT — ENCOUNTER SYMPTOMS
WHEEZING: 0
VOMITING: 0
DIARRHEA: 0
SHORTNESS OF BREATH: 1
BLOOD IN STOOL: 0
ABDOMINAL PAIN: 0
TROUBLE SWALLOWING: 0

## 2020-06-10 ASSESSMENT — PATIENT HEALTH QUESTIONNAIRE - PHQ9
2. FEELING DOWN, DEPRESSED OR HOPELESS: 0
SUM OF ALL RESPONSES TO PHQ QUESTIONS 1-9: 0
1. LITTLE INTEREST OR PLEASURE IN DOING THINGS: 0
SUM OF ALL RESPONSES TO PHQ9 QUESTIONS 1 & 2: 0
SUM OF ALL RESPONSES TO PHQ QUESTIONS 1-9: 0

## 2020-07-14 ENCOUNTER — TELEPHONE (OUTPATIENT)
Dept: ONCOLOGY | Age: 50
End: 2020-07-14

## 2020-07-14 ENCOUNTER — HOSPITAL ENCOUNTER (OUTPATIENT)
Age: 50
Discharge: HOME | End: 2020-07-14
Payer: COMMERCIAL

## 2020-07-14 ENCOUNTER — INITIAL CONSULT (OUTPATIENT)
Dept: ONCOLOGY | Age: 50
End: 2020-07-14
Payer: COMMERCIAL

## 2020-07-14 VITALS
WEIGHT: 131.5 LBS | HEIGHT: 66 IN | RESPIRATION RATE: 16 BRPM | HEART RATE: 69 BPM | TEMPERATURE: 98.4 F | BODY MASS INDEX: 21.13 KG/M2 | DIASTOLIC BLOOD PRESSURE: 70 MMHG | SYSTOLIC BLOOD PRESSURE: 127 MMHG

## 2020-07-14 PROCEDURE — 99244 OFF/OP CNSLTJ NEW/EST MOD 40: CPT | Performed by: INTERNAL MEDICINE

## 2020-07-14 RX ORDER — FERROUS GLUCONATE 324(37.5)
324 TABLET ORAL 2 TIMES DAILY
Qty: 60 TABLET | Refills: 3 | Status: SHIPPED | OUTPATIENT
Start: 2020-07-14 | End: 2020-11-19

## 2020-07-14 NOTE — TELEPHONE ENCOUNTER
Pt to begin oral iron-pt to return on 10/20/2020 to see Dr Cassia Jay to be done prior to appt-lab slips given to pt w/instruction, pt understood and stated will have done at [facility]'s

## 2020-07-14 NOTE — PROGRESS NOTES
DIAGNOSIS:   1. Hereditary hemorrhagic telangectasia     CURRENT THERAPY:  1. Oral iron     BRIEF CASE HISTORY:   Edward Britton is a very pleasant 52 y.o. female who is referred to us for family history of HHT. She reports both her daughter and father have been diagnosed. She has had increased fatigue and has history of epistaxis and menorrhagia and her periods recently have become irregular. She has recently bleeding in the gums with telangectasia on the tongue and one on the right thigh. She had some bleeding complications with 2nd child's delivery. She has no health problems and does not take medications. She works in [employer] and wears protective equipment. She smokes cigarettes and marijuana, she consumes very minimal alcohol. Her father frequent episodes with bleeding and receiving regular transfusions and iron. Her daughter is 29, she was diagnosed during pregnancy with 1st child and underwent testing via [facility]. PAST MEDICAL HISTORY: has no past medical history on file. PAST SURGICAL HISTORY: has a past surgical history that includes Tubal ligation. CURRENT MEDICATIONS:  currently has no medications in their medication list.    ALLERGIES:  is allergic to penicillins. FAMILY HISTORY: Grandmother, father, and daughter with [address]:  reports that she has been smoking. She has been smoking about 1.00 pack per day. She has never used smokeless tobacco. She reports current alcohol use. She reports that she does not use drugs. REVIEW OF SYSTEMS:   General: no fever or night sweats, Weight is stable. +fatigue   ENT: No double or blurred vision, no tinnitus or hearing problem, no dysphagia or sore throat   Respiratory: No chest pain, no shortness of breath, no cough or hemoptysis. Cardiovascular: Denies chest pain, PND or orthopnea. No L E swelling or palpitations. Gastrointestinal:    No nausea or vomiting, abdominal pain, diarrhea or constipation. Genitourinary: Denies dysuria, hematuria, frequency, urgency or incontinence. Neurological: Denies headaches, decreased LOC, no sensory or motor focal deficits. Musculoskeletal:  No arthralgia no back pain or joint swelling. Skin: There are no rashes or bleeding. +telangectesia on the tongue and right thigh  Psychiatric:  No anxiety, no depression. Endocrine: no diabetes or thyroid disease. Hematologic: no bleeding , no adenopathy. PHYSICAL EXAM: Shows a well appearing 52y.o.-year-old female who is not in pain or distress. Vital Signs: Blood pressure 127/70, pulse 69, temperature 98.4 °F (36.9 °C), resp. rate 16, height 5' 6\" (1.676 m), weight 131 lb 8 oz (59.6 kg). HEENT: telangectasia on the tongue Normocephalic and atraumatic. Pupils are equal, round, reactive to light and accommodation. Extraocular muscles are intact. Neck: Showed no JVD, no carotid bruit . Lungs: Clear to auscultation bilaterally. Heart: Regular without any murmur. Abdomen: Soft, nontender. No hepatosplenomegaly. Extremities: telangectasia on the right thigh Lower extremities show no edema, clubbing, or cyanosis. Breasts: Examination not done today. Neuro exam: intact cranial nerves bilaterally no motor or sensory deficit, gait is normal. Lymphatic: no adenopathy appreciated in the supraclavicular, axillary, cervical or inguinal area    REVIEW OF LABORATORY DATA:   Lab Results   Component Value Date    WBC 9.9 05/29/2020    HGB 12.9 05/29/2020    HCT 40.0 05/29/2020    .8 05/29/2020     05/29/2020     Lab Results   Component Value Date    FERRITIN 32 05/10/2017     Lab Results   Component Value Date    IRON 113 05/10/2017    TIBC 301 05/10/2017    FERRITIN 32 05/10/2017       REVIEW OF RADIOLOGICAL RESULTS:     IMPRESSION:   1. Hereditary hemorrhagic telangiectasia       PLAN:   1. We had detailed discussion regarding her family history with hemorrhagic telangiectasia.    2. Exam shows spot on the tongue and the thigh.   3. We reviewed her lab work showing lower ferritin. 4. I explained treatment plan to start with oral iron and goals of treatment. 5. With her familial and personal history I do not recommend genetic testing unless required by insurance. 6. Return in 3 months.

## 2020-09-10 ENCOUNTER — HOSPITAL ENCOUNTER (OUTPATIENT)
Age: 50
Setting detail: SPECIMEN
Discharge: HOME | End: 2020-09-10
Payer: COMMERCIAL

## 2020-09-10 ENCOUNTER — OFFICE VISIT (OUTPATIENT)
Dept: PRIMARY CARE CLINIC | Age: 50
End: 2020-09-10
Payer: COMMERCIAL

## 2020-09-10 VITALS
DIASTOLIC BLOOD PRESSURE: 82 MMHG | HEART RATE: 88 BPM | OXYGEN SATURATION: 97 % | WEIGHT: 126 LBS | TEMPERATURE: 98.2 F | SYSTOLIC BLOOD PRESSURE: 118 MMHG | BODY MASS INDEX: 20.34 KG/M2

## 2020-09-10 PROCEDURE — 99396 PREV VISIT EST AGE 40-64: CPT | Performed by: NURSE PRACTITIONER

## 2020-09-10 NOTE — PROGRESS NOTES
[facility]  [id number] [facility] [address]  Dept: [phone]  Dept Fax: [phone]    Subjective:     CHIEF COMPLAINT:     Chief Complaint   Patient presents with   Ge Rodriguez Gynecologic Exam       Bob Abernathy is here today for routine gynecological exam.  She admits she is to have a Pap smear in roughly 25 years. She did have evidence of dysplasia on her Pap smear at her 6-week postpartum checkup. She had at least one vaginal exam after that that came back normal.   There is a strong family history of HHT, a bleeding disorder. She recently had an evaluation with a hematologist, who placed her on daily iron. She does have occasional nosebleeds or bleeding in her mouth which is normal.        PREVENTIVE HEALTH SCREENING:   Date of last pap: unknown                Abnormal pap smear history: yes  Date of last mammogram: none   Date of last DEXA scan: none  Date of last colonoscopy: none    History of Gestational Diabetes: No     If Yes, Glucose screening ordered:No    Family history of Breast, Ovarian, Colon or Uterine Cancer:  Uterine in sister, from HPV. Other sister with breast cancer in her 19's         Objective:   GYNECOLOGIC HISTORY:     LMP: July 6th  Monthly menses (days): every 35 days  Length: 5-7 days  Flow: varies    Menopause: no    Sexually active: Yes    STD history: No    Hormone Replacement: no    Birth control method :No  Permanent Sterilization: Yes    SOCIAL HISTORY:  Seat Belt Use: Yes  Domestic Violence: No    Counseling: Yes  Regular exercise: No   Counseling: Yes  Diet discussed: Yes    REVIEW OF SYSTEMS:    1. Constitutional: No fever, chills or malaise. No weight change or fatigue  2. Head and eyes: No headache, dizziness or trauma. No visual changes  3. ENT: No hearing loss, tinnitus, sinus or taste problems  4. Hematologic: No lymphoma, Von Willebrand's, Hemophilia or bruising  5.  Psychological: No depression, suicidal thoughts, crying  or anxiety  6. Breast: No skin changes, masses, mastalgia, discharge  7. Respiratory:  No SOB, cough, wheezing  8. Cardiovascular: No chest pain with exertion, palpitations, syncope, or edema  9. Gastrointestinal:  No heartburn, N/V, bloody stools, pain  10. Genito-urinary: No dysuria, hematuria, dyspareunia, abnormal bleeding  11. Musculoskeletal: No arthralgia, gout, muscle weakness  12. Neurological: No CVA, migraines, seizures, syncope, numbness  13. Dermatologic: No rashes, itching, hives  14. Lymphatic: No adenopathy    Physical Exam:     Constitutional:   Blood pressure 118/82, pulse 88, temperature 98.2 °F (36.8 °C), weight 126 lb (57.2 kg), SpO2 97 %. General Appearance: This is a well-developed, well-nourished and well-groomed female    Skin:  Inspection of the skin revealed no rashes or lesions    Neck and EENT:  The neck was supple with full range of motion and no masses. The thyroid was not enlarged and had no masses. Normal external ears are present  Nares are patent    Respiratory: The lungs were clear to auscultation bilaterally. There were no rales, rhonchi or wheezes. There was good respiratory effort. Cardiovascular: The heart was in a regular rhythm and rate was normal.  No murmur or extra sounds were noted. Breast:  The breasts are normal size and symmetrical.  There are no skin changes with position changes. The nipples are without deviations or discharge. No masses were palpated. No axillary lymphadenopathy is present. Abdomen: The abdomen is soft and non-tender. There was no guarding, rebound or rigidity. The bladder was without fullness or tenderness. No hernias were appreciated. No CVA tenderness present    Pelvic: The external genitalia has a normal appearance without masses or lesions. BUS is normal.  The vagina is pink and well rugated. The cervix is without lesions with no CMT.  evidence of slight, bright red blood in the vaginal vault. Pap was obtained without difficulty. The uterus is normal size, shape and consistency. The adnexa are without masses or tenderness. Rectum is normal.    Psychiatric:  Alert, oriented to time, place, person and situation. There are no mood or affect changes. Assesment:     Routine annual gynecological exam      Plan:     1. Return for annual exam.  Pap per current recommendations. 2.  Mammogram: Order placed. We did discuss she does have increased risk factors as her sister was diagnosed with breast cancer in her 25s. Patient receptive. .    3.  DEXA scan: Not currently indicated  4. Colon Ca screening: Not indicated  5. Routine health maintenance: Encouraged routine daily exercise. Screening labs ordered for lipids and diabetes. Patient was seen with total face to face time of 25 minutes. More than 50% of this visit was counseling and education regarding    Diagnosis Orders   1. Encounter for Papanicolaou smear for cervical cancer screening  PAP SMEAR   2. Breast cancer screening by mammogram  REID DIGITAL SCREEN W OR WO CAD BILATERAL   3. Diabetes mellitus screening  Basic Metabolic Panel   4.  Lipid screening  Lipid, Fasting

## 2020-09-10 NOTE — PROGRESS NOTES
--Jarrod Walker MA on 9/10/2020 at 8:47 AM  Visit Information    Have you changed or started any medications since your last visit including any over-the-counter medicines, vitamins, or herbal medicines? no   Are you having any side effects from any of your medications? -  no  Have you stopped taking any of your medications? Is so, why? -  no    Have you seen any other physician or provider since your last visit? Yes - Records Requested  Have you had any other diagnostic tests since your last visit? No  Have you been seen in the emergency room and/or had an admission to a hospital since we last saw you? No  Have you had your routine dental cleaning in the past 6 months? no    Have you activated your [employer] account? If not, what are your barriers?  No     Patient Care Team:  STACEY Barrow CNP as PCP - General (Family Medicine)  STACEY Barrow CNP as PCP - [facility]    Medical History Review  Past Medical, Family, and Social History reviewed and does not contribute to the patient presenting condition    Health Maintenance   Topic Date Due    Pneumococcal 0-64 years Vaccine (1 of 1 - PPSV23) 06/18/1978    HIV screen  06/18/1987    DTaP/Tdap/Td vaccine (1 - Tdap) 06/18/1991    Cervical cancer screen  06/18/1993    Breast cancer screen  06/18/2012    Flu vaccine (1) 09/01/2020    Lipid screen  05/10/2022    Hepatitis A vaccine  Aged Out    Hepatitis B vaccine  Aged Out    Hib vaccine  Aged Out    Meningococcal (ACWY) vaccine  Aged Out

## 2020-09-19 LAB
HPV SOURCE: NORMAL
HPV, GENOTYPE 16: NOT DETECTED
HPV, GENOTYPE 18: NOT DETECTED
HPV, HIGH RISK OTHER: DETECTED

## 2020-09-21 LAB — CYTOLOGY REPORT: NORMAL

## 2020-10-12 ENCOUNTER — TELEPHONE (OUTPATIENT)
Dept: ONCOLOGY | Age: 50
End: 2020-10-12

## 2020-10-15 ENCOUNTER — HOSPITAL ENCOUNTER (OUTPATIENT)
Age: 50
Discharge: HOME | End: 2020-10-15
Payer: COMMERCIAL

## 2020-10-15 LAB
ABSOLUTE EOS #: 0.28 K/UL (ref 0–0.44)
ABSOLUTE IMMATURE GRANULOCYTE: 0.03 K/UL (ref 0–0.3)
ABSOLUTE LYMPH #: 1.73 K/UL (ref 1.1–3.7)
ABSOLUTE MONO #: 0.38 K/UL (ref 0.1–1.2)
ANION GAP SERPL CALCULATED.3IONS-SCNC: 7 MMOL/L (ref 9–17)
BASOPHILS # BLD: 1 % (ref 0–2)
BASOPHILS ABSOLUTE: 0.05 K/UL (ref 0–0.2)
BUN BLDV-MCNC: 9 MG/DL (ref 6–20)
BUN/CREAT BLD: ABNORMAL (ref 9–20)
CALCIUM SERPL-MCNC: 8.9 MG/DL (ref 8.6–10.4)
CHLORIDE BLD-SCNC: 105 MMOL/L (ref 98–107)
CHOLESTEROL, FASTING: 166 MG/DL
CHOLESTEROL/HDL RATIO: 2.7
CO2: 26 MMOL/L (ref 20–31)
CREAT SERPL-MCNC: 0.7 MG/DL (ref 0.5–0.9)
DIFFERENTIAL TYPE: ABNORMAL
EOSINOPHILS RELATIVE PERCENT: 5 % (ref 1–4)
FERRITIN: 42 UG/L (ref 13–150)
GFR AFRICAN AMERICAN: >60 ML/MIN
GFR NON-AFRICAN AMERICAN: >60 ML/MIN
GFR SERPL CREATININE-BSD FRML MDRD: ABNORMAL ML/MIN/{1.73_M2}
GFR SERPL CREATININE-BSD FRML MDRD: ABNORMAL ML/MIN/{1.73_M2}
GLUCOSE BLD-MCNC: 89 MG/DL (ref 70–99)
HCT VFR BLD CALC: 40.4 % (ref 36.3–47.1)
HDLC SERPL-MCNC: 61 MG/DL
HEMOGLOBIN: 13.2 G/DL (ref 11.9–15.1)
IMMATURE GRANULOCYTES: 1 %
LDL CHOLESTEROL: 89 MG/DL (ref 0–130)
LYMPHOCYTES # BLD: 28 % (ref 24–43)
MCH RBC QN AUTO: 33.2 PG (ref 25.2–33.5)
MCHC RBC AUTO-ENTMCNC: 32.7 G/DL (ref 28.4–34.8)
MCV RBC AUTO: 101.8 FL (ref 82.6–102.9)
MONOCYTES # BLD: 6 % (ref 3–12)
NRBC AUTOMATED: 0 PER 100 WBC
PDW BLD-RTO: 12.8 % (ref 11.8–14.4)
PLATELET # BLD: 271 K/UL (ref 138–453)
PLATELET ESTIMATE: ABNORMAL
PMV BLD AUTO: 9 FL (ref 8.1–13.5)
POTASSIUM SERPL-SCNC: 4.5 MMOL/L (ref 3.7–5.3)
RBC # BLD: 3.97 M/UL (ref 3.95–5.11)
RBC # BLD: ABNORMAL 10*6/UL
SEG NEUTROPHILS: 59 % (ref 36–65)
SEGMENTED NEUTROPHILS ABSOLUTE COUNT: 3.7 K/UL (ref 1.5–8.1)
SODIUM BLD-SCNC: 138 MMOL/L (ref 135–144)
TRIGLYCERIDE, FASTING: 78 MG/DL
VLDLC SERPL CALC-MCNC: NORMAL MG/DL (ref 1–30)
WBC # BLD: 6.2 K/UL (ref 3.5–11.3)
WBC # BLD: ABNORMAL 10*3/UL

## 2020-10-15 PROCEDURE — 36415 COLL VENOUS BLD VENIPUNCTURE: CPT

## 2020-10-15 PROCEDURE — 80061 LIPID PANEL: CPT

## 2020-10-15 PROCEDURE — 82728 ASSAY OF FERRITIN: CPT

## 2020-10-15 PROCEDURE — 85025 COMPLETE CBC W/AUTO DIFF WBC: CPT

## 2020-10-15 PROCEDURE — 80048 BASIC METABOLIC PNL TOTAL CA: CPT

## 2020-10-20 ENCOUNTER — OFFICE VISIT (OUTPATIENT)
Dept: ONCOLOGY | Age: 50
End: 2020-10-20
Payer: COMMERCIAL

## 2020-10-20 ENCOUNTER — TELEPHONE (OUTPATIENT)
Dept: ONCOLOGY | Age: 50
End: 2020-10-20

## 2020-10-20 VITALS
BODY MASS INDEX: 21.76 KG/M2 | RESPIRATION RATE: 16 BRPM | HEART RATE: 65 BPM | DIASTOLIC BLOOD PRESSURE: 68 MMHG | TEMPERATURE: 98.2 F | WEIGHT: 134.8 LBS | SYSTOLIC BLOOD PRESSURE: 121 MMHG

## 2020-10-20 PROCEDURE — 99214 OFFICE O/P EST MOD 30 MIN: CPT | Performed by: INTERNAL MEDICINE

## 2020-10-20 PROCEDURE — 99211 OFF/OP EST MAY X REQ PHY/QHP: CPT | Performed by: INTERNAL MEDICINE

## 2020-10-20 NOTE — PROGRESS NOTES
DIAGNOSIS:   1. Hereditary hemorrhagic telangectasia     CURRENT THERAPY:  1. Oral iron     BRIEF CASE HISTORY:   Antonia Jaime is a very pleasant 52 y.o. female who is referred to us for family history of HHT. She reports both her daughter and father have been diagnosed. She has had increased fatigue and has history of epistaxis and menorrhagia and her periods recently have become irregular. She has recently bleeding in the gums with telangectasia on the tongue and one on the right thigh. She had some bleeding complications with 2nd child's delivery. She has no health problems and does not take medications. She works in [employer] and wears protective equipment. She smokes cigarettes and marijuana, she consumes very minimal alcohol. Her father frequent episodes with bleeding and receiving regular transfusions and iron. Her daughter is 29, she was diagnosed during pregnancy with 1st child and underwent testing via [facility]. INTERIM HISTORY: The patient presents for follow up for telangectasia. She has been taking the oral iron and notes some occasional diarrhea. She reports her vomiting and fatigue have resolved and she feels quite well. She has had occasional nose bleeds, no other bleeding. She reports she has arthralgias in the foot and takes NSAIDS to manage on the worst days, about once monthly. PAST MEDICAL HISTORY: has no past medical history on file. PAST SURGICAL HISTORY: has a past surgical history that includes Tubal ligation. CURRENT MEDICATIONS:  has a current medication list which includes the following prescription(s): ferrous gluconate. ALLERGIES:  is allergic to penicillins. FAMILY HISTORY: Grandmother, father, and daughter with [address]:  reports that she has been smoking. She has been smoking about 1.00 pack per day. She has never used smokeless tobacco. She reports current alcohol use. She reports that she does not use drugs.     REVIEW OF SYSTEMS:   General: no fever or night sweats, Weight is stable. +fatigue -resolved  ENT: No double or blurred vision, no tinnitus or hearing problem, no dysphagia or sore throat +epistaxis   Respiratory: No chest pain, no shortness of breath, no cough or hemoptysis. Cardiovascular: Denies chest pain, PND or orthopnea. No L E swelling or palpitations. Gastrointestinal: No nausea or vomiting, abdominal pain, or constipation. +mild diarrhea   Genitourinary: Denies dysuria, hematuria, frequency, urgency or incontinence. Neurological: Denies headaches, decreased LOC, no sensory or motor focal deficits. Musculoskeletal: No back pain or joint swelling. +arthritis in the foot  Skin: There are no rashes or bleeding. +telangectesia on the tongue and right thigh  Psychiatric: No anxiety, no depression. Endocrine: No diabetes or thyroid disease. Hematologic: No bleeding , no adenopathy. PHYSICAL EXAM: Shows a well appearing 52y.o.-year-old female who is not in pain or distress. Vital Signs: Blood pressure 121/68, pulse 65, temperature 98.2 °F (36.8 °C), temperature source Oral, resp. rate 16, weight 134 lb 12.8 oz (61.1 kg). HEENT: telangectasia on the tongue Normocephalic and atraumatic. Pupils are equal, round, reactive to light and accommodation. Extraocular muscles are intact. Neck: Showed no JVD, no carotid bruit . Lungs: Clear to auscultation bilaterally. Heart: Regular without any murmur. Abdomen: Soft, nontender. No hepatosplenomegaly. Extremities: telangectasia on the right thigh Lower extremities show no edema, clubbing, or cyanosis. Breasts: Examination not done today.  Neuro exam: intact cranial nerves bilaterally no motor or sensory deficit, gait is normal. Lymphatic: no adenopathy appreciated in the supraclavicular, axillary, cervical or inguinal area    REVIEW OF LABORATORY DATA:   Lab Results   Component Value Date    WBC 6.2 10/15/2020    HGB 13.2 10/15/2020    HCT 40.4 10/15/2020    .8 10/15/2020     10/15/2020     Lab Results   Component Value Date    FERRITIN 42 10/15/2020     Lab Results   Component Value Date    IRON 113 05/10/2017    TIBC 301 05/10/2017    FERRITIN 42 10/15/2020       REVIEW OF RADIOLOGICAL RESULTS:     IMPRESSION:   1. Hereditary hemorrhagic telangiectasia       PLAN:   1. We reviewed her lab work, her HGB and ferritin are in range. 2. She is doing well with oral iron and we will continue unchanged. 3. I discussed plan for IV iron in the event of GI bleeding due to her HHT. 4. Return in 3 months.

## 2020-10-20 NOTE — TELEPHONE ENCOUNTER
Pt was seen today by Dr. Korin Grace. Per avs, pt is scheduled for f/u on 01- (pt will have labs drawn one week prior at [facility]).

## 2020-11-06 ENCOUNTER — TELEPHONE (OUTPATIENT)
Dept: ONCOLOGY | Age: 50
End: 2020-11-06

## 2020-11-06 NOTE — TELEPHONE ENCOUNTER
FMLA paperwork received; however, patient is not on active treatment and doesn't see the doctor for a f/u until 3 months from now. Writer explained that we can complete a letter to excuse patient from the day of work when she comes into office for appt; however, FMLA is not appropriate as she is not on active tx. Patient communicated understanding.  Patricia Latif

## 2020-11-19 RX ORDER — FERROUS GLUCONATE 324(37.5)
TABLET ORAL
Qty: 60 TABLET | Refills: 3 | Status: SHIPPED | OUTPATIENT
Start: 2020-11-19

## 2021-01-15 DIAGNOSIS — I78.0 HHT (HEREDITARY HEMORRHAGIC TELANGIECTASIA) (HCC): Primary | ICD-10-CM

## 2021-07-26 ENCOUNTER — TELEPHONE (OUTPATIENT)
Dept: FAMILY MEDICINE CLINIC | Age: 51
End: 2021-07-26

## 2021-07-26 NOTE — TELEPHONE ENCOUNTER
Michael Medrano was contacted as part of mammography outreach. Message left on machine for patient to return call to schedule appointment.

## 2022-07-01 ENCOUNTER — APPOINTMENT (OUTPATIENT)
Dept: CT IMAGING | Age: 52
End: 2022-07-01
Payer: COMMERCIAL

## 2022-07-01 ENCOUNTER — APPOINTMENT (OUTPATIENT)
Dept: ULTRASOUND IMAGING | Age: 52
End: 2022-07-01
Payer: COMMERCIAL

## 2022-07-01 ENCOUNTER — HOSPITAL ENCOUNTER (OUTPATIENT)
Age: 52
Setting detail: OBSERVATION
Discharge: HOME | End: 2022-07-02
Attending: EMERGENCY MEDICINE | Admitting: INTERNAL MEDICINE
Payer: COMMERCIAL

## 2022-07-01 DIAGNOSIS — R11.2 INTRACTABLE VOMITING WITH NAUSEA, UNSPECIFIED VOMITING TYPE: Primary | ICD-10-CM

## 2022-07-01 DIAGNOSIS — N30.01 ACUTE CYSTITIS WITH HEMATURIA: ICD-10-CM

## 2022-07-01 DIAGNOSIS — K85.90 ACUTE PANCREATITIS, UNSPECIFIED COMPLICATION STATUS, UNSPECIFIED PANCREATITIS TYPE: ICD-10-CM

## 2022-07-01 DIAGNOSIS — E87.6 HYPOKALEMIA: ICD-10-CM

## 2022-07-01 PROBLEM — F12.90 MARIJUANA USE, CONTINUOUS: Status: ACTIVE | Noted: 2022-07-01

## 2022-07-01 PROBLEM — N39.0 UTI (URINARY TRACT INFECTION): Status: ACTIVE | Noted: 2022-07-01

## 2022-07-01 PROBLEM — R10.13 EPIGASTRIC ABDOMINAL PAIN: Status: ACTIVE | Noted: 2022-07-01

## 2022-07-01 LAB
-: ABNORMAL
ABSOLUTE EOS #: 0.04 K/UL (ref 0–0.44)
ABSOLUTE IMMATURE GRANULOCYTE: 0.04 K/UL (ref 0–0.3)
ABSOLUTE LYMPH #: 2.13 K/UL (ref 1.1–3.7)
ABSOLUTE MONO #: 0.73 K/UL (ref 0.1–1.2)
ALBUMIN SERPL-MCNC: 4.6 G/DL (ref 3.5–5.2)
ALBUMIN/GLOBULIN RATIO: 2 (ref 1–2.5)
ALP BLD-CCNC: 69 U/L (ref 35–104)
ALT SERPL-CCNC: 11 U/L (ref 5–33)
ANION GAP SERPL CALCULATED.3IONS-SCNC: 13 MMOL/L (ref 9–17)
AST SERPL-CCNC: 14 U/L
BACTERIA: ABNORMAL
BASOPHILS # BLD: 0 % (ref 0–2)
BASOPHILS ABSOLUTE: 0.05 K/UL (ref 0–0.2)
BILIRUB SERPL-MCNC: 0.73 MG/DL (ref 0.3–1.2)
BILIRUBIN URINE: NEGATIVE
BUN BLDV-MCNC: 9 MG/DL (ref 6–20)
CALCIUM SERPL-MCNC: 9.7 MG/DL (ref 8.6–10.4)
CHLORIDE BLD-SCNC: 99 MMOL/L (ref 98–107)
CHOLESTEROL/HDL RATIO: 4.1
CHOLESTEROL: 215 MG/DL
CO2: 26 MMOL/L (ref 20–31)
COLOR: YELLOW
CREAT SERPL-MCNC: 0.66 MG/DL (ref 0.5–0.9)
EOSINOPHILS RELATIVE PERCENT: 0 % (ref 1–4)
EPITHELIAL CELLS UA: ABNORMAL /HPF (ref 0–5)
GFR AFRICAN AMERICAN: >60 ML/MIN
GFR NON-AFRICAN AMERICAN: >60 ML/MIN
GFR SERPL CREATININE-BSD FRML MDRD: ABNORMAL ML/MIN/{1.73_M2}
GLUCOSE BLD-MCNC: 105 MG/DL (ref 70–99)
GLUCOSE URINE: NEGATIVE
HCT VFR BLD CALC: 44.5 % (ref 36.3–47.1)
HDLC SERPL-MCNC: 52 MG/DL
HEMOGLOBIN: 15.1 G/DL (ref 11.9–15.1)
IMMATURE GRANULOCYTES: 0 %
KETONES, URINE: ABNORMAL
LACTIC ACID, WHOLE BLOOD: 1.6 MMOL/L (ref 0.7–2.1)
LDL CHOLESTEROL: 142 MG/DL (ref 0–130)
LEUKOCYTE ESTERASE, URINE: ABNORMAL
LIPASE: 111 U/L (ref 13–60)
LYMPHOCYTES # BLD: 16 % (ref 24–43)
MAGNESIUM: 1.8 MG/DL (ref 1.6–2.6)
MCH RBC QN AUTO: 32.4 PG (ref 25.2–33.5)
MCHC RBC AUTO-ENTMCNC: 33.9 G/DL (ref 28.4–34.8)
MCV RBC AUTO: 95.5 FL (ref 82.6–102.9)
MONOCYTES # BLD: 5 % (ref 3–12)
NITRITE, URINE: NEGATIVE
NRBC AUTOMATED: 0 PER 100 WBC
PDW BLD-RTO: 12.6 % (ref 11.8–14.4)
PH UA: 6 (ref 5–8)
PLATELET # BLD: 326 K/UL (ref 138–453)
PMV BLD AUTO: 8.7 FL (ref 8.1–13.5)
POTASSIUM SERPL-SCNC: 3.3 MMOL/L (ref 3.7–5.3)
PROTEIN UA: ABNORMAL
RBC # BLD: 4.66 M/UL (ref 3.95–5.11)
RBC UA: ABNORMAL /HPF (ref 0–4)
SEG NEUTROPHILS: 79 % (ref 36–65)
SEGMENTED NEUTROPHILS ABSOLUTE COUNT: 10.48 K/UL (ref 1.5–8.1)
SODIUM BLD-SCNC: 138 MMOL/L (ref 135–144)
SPECIFIC GRAVITY UA: 1.02 (ref 1–1.03)
TOTAL PROTEIN: 6.9 G/DL (ref 6.4–8.3)
TRIGL SERPL-MCNC: 106 MG/DL
TURBIDITY: ABNORMAL
URINE HGB: ABNORMAL
UROBILINOGEN, URINE: NORMAL
WBC # BLD: 13.5 K/UL (ref 3.5–11.3)
WBC UA: ABNORMAL /HPF (ref 0–5)

## 2022-07-01 PROCEDURE — 85025 COMPLETE CBC W/AUTO DIFF WBC: CPT

## 2022-07-01 PROCEDURE — 83605 ASSAY OF LACTIC ACID: CPT

## 2022-07-01 PROCEDURE — 96372 THER/PROPH/DIAG INJ SC/IM: CPT

## 2022-07-01 PROCEDURE — G0378 HOSPITAL OBSERVATION PER HR: HCPCS

## 2022-07-01 PROCEDURE — 6360000002 HC RX W HCPCS: Performed by: NURSE PRACTITIONER

## 2022-07-01 PROCEDURE — 87086 URINE CULTURE/COLONY COUNT: CPT

## 2022-07-01 PROCEDURE — 96375 TX/PRO/DX INJ NEW DRUG ADDON: CPT

## 2022-07-01 PROCEDURE — 80061 LIPID PANEL: CPT

## 2022-07-01 PROCEDURE — 96374 THER/PROPH/DIAG INJ IV PUSH: CPT

## 2022-07-01 PROCEDURE — 6360000002 HC RX W HCPCS: Performed by: HEALTH CARE PROVIDER

## 2022-07-01 PROCEDURE — 6360000004 HC RX CONTRAST MEDICATION: Performed by: FAMILY MEDICINE

## 2022-07-01 PROCEDURE — 6360000004 HC RX CONTRAST MEDICATION: Performed by: INTERNAL MEDICINE

## 2022-07-01 PROCEDURE — 81001 URINALYSIS AUTO W/SCOPE: CPT

## 2022-07-01 PROCEDURE — 2580000003 HC RX 258: Performed by: NURSE PRACTITIONER

## 2022-07-01 PROCEDURE — 83690 ASSAY OF LIPASE: CPT

## 2022-07-01 PROCEDURE — 99223 1ST HOSP IP/OBS HIGH 75: CPT | Performed by: INTERNAL MEDICINE

## 2022-07-01 PROCEDURE — 83735 ASSAY OF MAGNESIUM: CPT

## 2022-07-01 PROCEDURE — 99285 EMERGENCY DEPT VISIT HI MDM: CPT

## 2022-07-01 PROCEDURE — 1200000000 HC SEMI PRIVATE

## 2022-07-01 PROCEDURE — 2580000003 HC RX 258: Performed by: HEALTH CARE PROVIDER

## 2022-07-01 PROCEDURE — 74178 CT ABD&PLV WO CNTR FLWD CNTR: CPT

## 2022-07-01 PROCEDURE — 80053 COMPREHEN METABOLIC PANEL: CPT

## 2022-07-01 PROCEDURE — 6360000002 HC RX W HCPCS: Performed by: INTERNAL MEDICINE

## 2022-07-01 PROCEDURE — 6370000000 HC RX 637 (ALT 250 FOR IP): Performed by: CLINICAL NURSE SPECIALIST

## 2022-07-01 PROCEDURE — 2500000003 HC RX 250 WO HCPCS: Performed by: INTERNAL MEDICINE

## 2022-07-01 PROCEDURE — 76705 ECHO EXAM OF ABDOMEN: CPT

## 2022-07-01 RX ORDER — ONDANSETRON 4 MG/1
4 TABLET, ORALLY DISINTEGRATING ORAL EVERY 8 HOURS PRN
Status: DISCONTINUED | OUTPATIENT
Start: 2022-07-01 | End: 2022-07-02 | Stop reason: HOSPADM

## 2022-07-01 RX ORDER — SODIUM CHLORIDE, SODIUM LACTATE, POTASSIUM CHLORIDE, AND CALCIUM CHLORIDE .6; .31; .03; .02 G/100ML; G/100ML; G/100ML; G/100ML
1000 INJECTION, SOLUTION INTRAVENOUS ONCE
Status: COMPLETED | OUTPATIENT
Start: 2022-07-01 | End: 2022-07-01

## 2022-07-01 RX ORDER — ONDANSETRON 2 MG/ML
4 INJECTION INTRAMUSCULAR; INTRAVENOUS EVERY 6 HOURS PRN
Status: DISCONTINUED | OUTPATIENT
Start: 2022-07-01 | End: 2022-07-02 | Stop reason: HOSPADM

## 2022-07-01 RX ORDER — SODIUM CHLORIDE 9 MG/ML
INJECTION, SOLUTION INTRAVENOUS CONTINUOUS
Status: DISCONTINUED | OUTPATIENT
Start: 2022-07-01 | End: 2022-07-02 | Stop reason: HOSPADM

## 2022-07-01 RX ORDER — LORAZEPAM 0.5 MG/1
0.5 TABLET ORAL NIGHTLY PRN
Status: DISCONTINUED | OUTPATIENT
Start: 2022-07-01 | End: 2022-07-02 | Stop reason: HOSPADM

## 2022-07-01 RX ORDER — MAGNESIUM SULFATE 1 G/100ML
1000 INJECTION INTRAVENOUS PRN
Status: DISCONTINUED | OUTPATIENT
Start: 2022-07-01 | End: 2022-07-02 | Stop reason: HOSPADM

## 2022-07-01 RX ORDER — DIPHENHYDRAMINE HYDROCHLORIDE 50 MG/ML
25 INJECTION INTRAMUSCULAR; INTRAVENOUS ONCE
Status: COMPLETED | OUTPATIENT
Start: 2022-07-01 | End: 2022-07-01

## 2022-07-01 RX ORDER — SODIUM CHLORIDE 0.9 % (FLUSH) 0.9 %
5-40 SYRINGE (ML) INJECTION PRN
Status: DISCONTINUED | OUTPATIENT
Start: 2022-07-01 | End: 2022-07-02 | Stop reason: HOSPADM

## 2022-07-01 RX ORDER — POTASSIUM CHLORIDE 7.45 MG/ML
10 INJECTION INTRAVENOUS
Status: DISPENSED | OUTPATIENT
Start: 2022-07-01 | End: 2022-07-01

## 2022-07-01 RX ORDER — HALOPERIDOL 5 MG/ML
5 INJECTION INTRAMUSCULAR ONCE
Status: COMPLETED | OUTPATIENT
Start: 2022-07-01 | End: 2022-07-01

## 2022-07-01 RX ORDER — ENOXAPARIN SODIUM 100 MG/ML
40 INJECTION SUBCUTANEOUS DAILY
Status: DISCONTINUED | OUTPATIENT
Start: 2022-07-01 | End: 2022-07-02 | Stop reason: HOSPADM

## 2022-07-01 RX ORDER — SODIUM CHLORIDE 0.9 % (FLUSH) 0.9 %
5-40 SYRINGE (ML) INJECTION EVERY 12 HOURS SCHEDULED
Status: DISCONTINUED | OUTPATIENT
Start: 2022-07-01 | End: 2022-07-02 | Stop reason: HOSPADM

## 2022-07-01 RX ORDER — SODIUM CHLORIDE 9 MG/ML
INJECTION, SOLUTION INTRAVENOUS PRN
Status: DISCONTINUED | OUTPATIENT
Start: 2022-07-01 | End: 2022-07-02 | Stop reason: HOSPADM

## 2022-07-01 RX ORDER — FERROUS GLUCONATE 324(37.5)
324 TABLET ORAL 2 TIMES DAILY
Status: DISCONTINUED | OUTPATIENT
Start: 2022-07-01 | End: 2022-07-01

## 2022-07-01 RX ORDER — POTASSIUM CHLORIDE 7.45 MG/ML
10 INJECTION INTRAVENOUS PRN
Status: DISCONTINUED | OUTPATIENT
Start: 2022-07-01 | End: 2022-07-02 | Stop reason: HOSPADM

## 2022-07-01 RX ADMIN — SODIUM CHLORIDE, POTASSIUM CHLORIDE, SODIUM LACTATE AND CALCIUM CHLORIDE 1000 ML: 600; 310; 30; 20 INJECTION, SOLUTION INTRAVENOUS at 01:28

## 2022-07-01 RX ADMIN — SODIUM CHLORIDE: 9 INJECTION, SOLUTION INTRAVENOUS at 14:15

## 2022-07-01 RX ADMIN — ONDANSETRON 4 MG: 2 INJECTION INTRAMUSCULAR; INTRAVENOUS at 18:21

## 2022-07-01 RX ADMIN — POTASSIUM CHLORIDE 10 MEQ: 7.46 INJECTION, SOLUTION INTRAVENOUS at 07:36

## 2022-07-01 RX ADMIN — IOPAMIDOL 75 ML: 755 INJECTION, SOLUTION INTRAVENOUS at 11:49

## 2022-07-01 RX ADMIN — SODIUM CHLORIDE, POTASSIUM CHLORIDE, SODIUM LACTATE AND CALCIUM CHLORIDE 1000 ML: 600; 310; 30; 20 INJECTION, SOLUTION INTRAVENOUS at 02:46

## 2022-07-01 RX ADMIN — CEFTRIAXONE SODIUM 1000 MG: 1 INJECTION, POWDER, FOR SOLUTION INTRAMUSCULAR; INTRAVENOUS at 09:46

## 2022-07-01 RX ADMIN — SODIUM CHLORIDE: 9 INJECTION, SOLUTION INTRAVENOUS at 05:11

## 2022-07-01 RX ADMIN — POTASSIUM CHLORIDE 10 MEQ: 7.46 INJECTION, SOLUTION INTRAVENOUS at 02:48

## 2022-07-01 RX ADMIN — DIPHENHYDRAMINE HYDROCHLORIDE 25 MG: 50 INJECTION, SOLUTION INTRAMUSCULAR; INTRAVENOUS at 01:37

## 2022-07-01 RX ADMIN — IOHEXOL 50 ML: 240 INJECTION, SOLUTION INTRATHECAL; INTRAVASCULAR; INTRAVENOUS; ORAL at 09:46

## 2022-07-01 RX ADMIN — CEFTRIAXONE SODIUM 1000 MG: 1 INJECTION, POWDER, FOR SOLUTION INTRAMUSCULAR; INTRAVENOUS at 03:53

## 2022-07-01 RX ADMIN — LORAZEPAM 0.5 MG: 0.5 TABLET ORAL at 21:13

## 2022-07-01 RX ADMIN — POTASSIUM CHLORIDE 10 MEQ: 7.46 INJECTION, SOLUTION INTRAVENOUS at 05:10

## 2022-07-01 RX ADMIN — HALOPERIDOL LACTATE 5 MG: 5 INJECTION, SOLUTION INTRAMUSCULAR at 01:39

## 2022-07-01 RX ADMIN — SODIUM CHLORIDE, PRESERVATIVE FREE 10 ML: 5 INJECTION INTRAVENOUS at 09:53

## 2022-07-01 RX ADMIN — ENOXAPARIN SODIUM 40 MG: 100 INJECTION SUBCUTANEOUS at 09:46

## 2022-07-01 RX ADMIN — POTASSIUM CHLORIDE 10 MEQ: 7.46 INJECTION, SOLUTION INTRAVENOUS at 03:52

## 2022-07-01 ASSESSMENT — ENCOUNTER SYMPTOMS
NAUSEA: 1
TROUBLE SWALLOWING: 0
ABDOMINAL PAIN: 1
BACK PAIN: 0
RECTAL PAIN: 0
VOMITING: 1
DIARRHEA: 0
CONSTIPATION: 0
SHORTNESS OF BREATH: 0

## 2022-07-01 ASSESSMENT — PAIN DESCRIPTION - LOCATION: LOCATION: THROAT

## 2022-07-01 ASSESSMENT — PAIN SCALES - GENERAL: PAINLEVEL_OUTOF10: 2

## 2022-07-01 ASSESSMENT — PAIN - FUNCTIONAL ASSESSMENT: PAIN_FUNCTIONAL_ASSESSMENT: NONE - DENIES PAIN

## 2022-07-01 NOTE — CARE COORDINATION
07/01/22 0916   Service Assessment   Patient Orientation Alert and Oriented   Cognition Alert   History Provided By Patient   Primary Caregiver Self   Accompanied By/Relationship pt's  [address] [facility] Spouse/Significant Other;Children   Patient's Healthcare Decision Maker is: Legal Next of Kin  (Pt's  Debbi Flynn)   PCP Verified by CM Yes   Last Visit to PCP Within last two years   Prior Functional Level Independent in ADLs/IADLs   Current Functional Level Independent in ADLs/IADLs   Can patient return to prior living arrangement Yes   Ability to make needs known: Good   Family able to assist with home care needs: Yes   Would you like for me to discuss the discharge plan with any other family members/significant others, and if so, who? Yes   Financial Resources   (None)   [employer]   (None)   Social/Functional History   Lives With Spouse; Other (comment)  (2-children and 4-grandchildren)   Type of Home Apartment   Home Layout Two level   Home Access Stairs to enter without rails   Entrance Stairs - Number of Steps 15 stairs to get to 2nd level   Bathroom Shower/Tub Walk-in shower   [address]   (None)   [facility] From Family   ADL Assistance Independent   Homemaking Assistance Independent   Homemaking Responsibilities Yes   Active  Yes   Mode of Transportation Truck   Education [employer] graduate   Occupation Part time employment   Type of Occupation [employer]   Discharge Planning   Type of Residence Apartment   Living Arrangements Spouse/Significant Other;Children   Current Services Prior To Admission None   Potential Assistance Needed N/A   Potential Assistance Purchasing Medications No   Meds-to-Beds: Does the patient want to have any new prescriptions delivered to bedside prior to discharge?  Yes  (Use [facility] and [place])   Type of Home Care Services None   Patient expects to be discharged to: Apartment   Follow Up Appointment: Best Day/Time    (none specifoc)   One/Two Story Residence Two story  (lives on 2nd story)   # of Interior Steps [address] Discharge   [address] Provided? No   Mode of Transport at Discharge Other (see comment)  (Has transportation)   Confirm Follow Up Transport Friends   Condition of Participation: Discharge Planning   The Plan for Transition of Care is related to the following treatment goals: Reason for low potassium and stop vomiting (is now improved)   The Patient and/or Patient Representative was provided with a Choice of Provider? Patient  (Denies needs)   The Patient and/Or Patient Representative agree with the Discharge Plan? Yes   Freedom of Choice list was provided with basic dialogue that supports the patient's individualized plan of care/goals, treatment preferences, and shares the quality data associated with the providers?   Yes   Pt plan to d/c home independently lives with spouse, will make f/u PCP appt  - walk in clinic, has transportation

## 2022-07-01 NOTE — ED NOTES
The following labs labeled by Chelly Baum RN. with pt sticker and tubed to lab: My self    [x] Blue     [x] Lavender   [] on ice  [x] Green/yellow  [x] Green/black [] on ice  [x] Yellow  [x] Red  [] Pink      [] COVID-19 swab    [] Rapid  [] PCR  [] Flu swab  [] Peds Viral Panel     [x] Urine Sample  [] Pelvic Cultures  [] Blood Cultures            Raul Martinez RN  07/01/22 0148

## 2022-07-01 NOTE — ED NOTES
Pt to ED complaining of nausea and vomiting that got worst since Monday. Pt states she's been dealing with this kind of condition since she was a child as stated. Pt denies history of HTN, DM, COPD or asthma. Pt states she has a blood disorder. Pt states no one is sick in the house just her. Body weakness verbalized upon assessment. Pt is alert and oriented x4. Vital signs taken and recorded. Call light button w/in reach. Will cont to monitor the pt.      Link LAVON Villegas  07/01/22 9718

## 2022-07-01 NOTE — ED NOTES
Patient resting in bed, rr even and unlabored. Patient given additional warm blankets. She ambulates to and from restroom with steady gait. Dr Smith Ohs at bedside to discuss admission to hospital.  Patient denies any nausea or abdominal pain at this time.      Varinder Pitt RN  07/01/22 7427

## 2022-07-01 NOTE — ED NOTES
Provided more warm blanket as requested. Pt is resting comfortably.      Maria Elena Rudd RN  07/01/22 7577

## 2022-07-01 NOTE — ED PROVIDER NOTES
[facility]     Emergency Department     Faculty Attestation    I performed a history and physical examination of the patient and discussed management with the resident. I have reviewed and agree with the residents findings including all diagnostic interpretations, and treatment plans as written. Any areas of disagreement are noted on the chart. I was personally present for the key portions of any procedures. I have documented in the chart those procedures where I was not present during the key portions. I have reviewed the emergency nurses triage note. I agree with the chief complaint, past medical history, past surgical history, allergies, medications, social and family history as documented unless otherwise noted below. Documentation of the HPI, Physical Exam and Medical Decision Making performed by scribes is based on my personal performance of the HPI, PE and MDM. For Physician Assistant/ Nurse Practitioner cases/documentation I have personally evaluated this patient and have completed at least one if not all key elements of the E/M (history, physical exam, and MDM). Additional findings are as noted. H/o cyclical vomiting since she was young, feels typical, vomiting now for 4 days. Patient in room spitting up occasional saliva, appears cachetic, abdomen soft, non distended, non tender  Dry mucus membranes    Check labs, end electrolytes. Haldol, benadryl, reassess.  Give IVF    Shabnam Gilliam D.O, M.P.H  Attending Emergency Medicine Physician         Shabnam Gilliam DO  07/01/22 0931

## 2022-07-01 NOTE — PLAN OF CARE
Problem: Discharge Planning  Goal: Discharge to home or other facility with appropriate resources  Outcome: Progressing     Problem: Safety - Adult  Goal: Free from fall injury  Outcome: Progressing     Problem: Safety - Adult  Goal: Free from fall injury  Outcome: Progressing     Problem: Discharge Planning  Goal: Discharge to home or other facility with appropriate resources  Outcome: Progressing

## 2022-07-01 NOTE — ED NOTES
Report given to [facility] [employer] All questions has been answered.      Kaur Mantilla RN  07/01/22 1068

## 2022-07-01 NOTE — PLAN OF CARE
Problem: Discharge Planning  Goal: Discharge to home or other facility with appropriate resources  7/1/2022 1527 by Wilbert Davis RN  Outcome: Progressing  7/1/2022 0527 by Yessi Thayer RN  Outcome: Progressing     Problem: Safety - Adult  Goal: Free from fall injury  7/1/2022 1527 by Wilbert Davis RN  Outcome: Progressing  7/1/2022 0527 by Yessi Thayer RN  Outcome: Progressing

## 2022-07-01 NOTE — ED PROVIDER NOTES
Faculty Sign-Out Attestation  Handoff taken on the following patient from prior Attending Physician: Irvin Hickey    I was available and discussed any additional care issues that arose and coordinated the management plans with the resident(s) caring for the patient during my duty period. Any areas of disagreement with residents documentation of care or procedures are noted on the chart. I was personally present for the key portions of any/all procedures during my duty period. I have documented in the chart those procedures where I was not present during the key portions.     Cyclic vomiting, if lab stable & symptoms improved,   Plan to discharge    Shu Crespo DO  Attending Physician     Shu Crespo DO  07/01/22 0202    Vomiting continues, uti > treated, elevated lipase > admitted     Shu Crespo DO  07/01/22 6766

## 2022-07-01 NOTE — PROGRESS NOTES
Occupational [employer]  Occupational Therapy Not Seen Note    DATE: 2022    NAME: Genia Hollis  MRN: [id number]   : 1970      Patient not seen this date for Occupational Therapy due to:    Patient at baseline functional level with no acute OT needs. Will defer OT evaluation at this time. Please reorder OT if future needs arise.          Electronically signed by FLORENCE Rousseau/OT on 2022 at 10:48 AM

## 2022-07-01 NOTE — H&P
[facility]  Office: [address], DO, Madisyn Rosas, DO, Josefina Curry, DO, Gianna Eunice Blood, DO, Chasity Anand MD, Jose J Krause MD, Igor Sloan MD, Sushil Douglas MD, Samson Gomez MD, Diane Lovell MD, Dakota Hewitt MD, Elsia Berman, DO, Kim Thomas MD,  Edwin Davis MD, Carina Hua MD, Pasquale Huff DO, Valarie Sanchez MD, Severiano Border, MD, Rosemary Almanza, DO, Radha Cortes MD, Lillian Onofre MD, Damon Seo [facility], [facility], [facility], Ly Pena, CNP, Bernard Palma, CNP, Arleen Tinsley, CNP, Melody Huntley, CNP, Charline Bo PA-C, Chad Duncan [facility], Amaris Pedraza, CNP, Nikkie Sullivan, CNP, Shaun Mancilla, CNP, Lawanda Mcneil, CNS, Mamta Diego [facility], Claudell Belfast, CNP, Dwayne Purdy, CNP, Aston Villanueva, [address]    HISTORY AND PHYSICAL EXAMINATION            Date:   7/1/2022  Patient name:  Genia Hollis  Date of admission:  7/1/2022 12:51 AM  MRN:   [id number]  Account:  [de-identified]  YOB: 1970  PCP:    STACEY Alejandra CNP  Room:   [address] [place] [address]-  Code Status:    Full Code    Chief Complaint:     Chief Complaint   Patient presents with    Emesis   \" I thought I was going to die\"    History Obtained From:     Patient    History of Present Illness:     Genia Hollis is a 50 y.o.  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 Patient describes abrupt onset of symptoms after eating Beverlie Leak fries on Monday night/overnight. States she awoke with acute severe epigastric abdominal pain with \"like a soreness or bruising\" with cramping with intractable nausea and vomiting. Patient's had multiple episodes of emesis for the past several days with poor p.o. intake.   She denies any hematemesis. She denies diarrhea constipation. She has noted increasing intermittent indigestion over the past week. She denies any NSAID use. She denies any prior issues with pancreatitis or abdominal symptoms. She presented with as her symptoms continue to persist without improvement. However her symptoms have improved after she got IV fluids in the ED with antiemetics and she states she feels \"a lot better now\". Denies fevers or chills. Patient does admit to a recent fall approximately 1 week ago where she fell hard, was mechanical trip and fall after she slipped on wet floor that she was mopping. She landed on her back. She denies any alcohol use. She did have episodic urinary urgency with incontinence but thought it was related to excessive vomiting. Denies dysuria hematuria. Denies flank pain back pain. denies prior issues with pancreatitis. Denies prior episodic abdominal pain or problems with indigestion prior to this week. Denies sick contacts. Denies NSAID use. She did take some Tylenol PM to help reduce her abdominal symptoms with modest improvement. Past Medical History:     No past medical history on file. Past Surgical History:     Past Surgical History:   Procedure Laterality Date    TUBAL LIGATION          Medications Prior to Admission:     Prior to Admission medications    Medication Sig Start Date End Date Taking? Authorizing Provider   ferrous gluconate 324 (37.5 Fe) MG TABS take 1 tablet by mouth twice a day 11/19/20   Douglas Amin MD        Allergies:     Penicillins    Social History:     Tobacco:    reports that she has been smoking. She has been smoking about 1.00 pack per day. She has never used smokeless tobacco.  Alcohol:      reports current alcohol use. Drug Use:  reports no history of drug use. Patient admits to chronic habitual marijuana use, denies binge drinking or excessive alcohol use, denies illegal drugs.       Family History:     Family History   Problem Relation Age of Onset    Breast Cancer Sister     Cancer Sister     Other Father         HHT   Father does have Kolby Katt Martinez as well. No family history of ulcerative colitis Crohn's or other GI issues. Review of Systems:     Positive and Negative as described in HPI. Review of Systems   Denies chest pain shortness of breath difficulty breathing. Denies irregular heartbeat or dizziness . She did feel some mild tachycardia with palpitations after the vomiting spells . Denies near syncope or collapse. Denies sleep apnea history of MI, diabetes, stroke or heart attack. 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 She denies any heavy menstrual bleeding. Denies any exposure to COVID, not vaccinated . Denies any flulike symptoms , cough congestion or URI symptoms . Denies recent antibiotics prior to arrival over the past 3 months . review of systems otherwise -12 system reviewed otherwise unremarkable    Physical Exam:   /81   Pulse 57   Temp 97 °F (36.1 °C) (Tympanic)   Resp 19   Ht 5' 6\" (1.676 m)   Wt 115 lb (52.2 kg)   SpO2 100%   BMI 18.56 kg/m²   Temp (24hrs), Av.9 °F (36.6 °C), Min:97 °F (36.1 °C), Max:98.8 °F (37.1 °C)    No results for input(s): POCGLU in the last 72 hours.   No intake or output data in the 24 hours ending 22 [id number]    Physical Exam   General awake alert pleasant sitting up in bed appropriate follows commands  Eye exam pupils equally round reactive sclera nonicteric normal horizontal gaze  ENT oropharynx with dry mucous membranes, adequate dentition face symmetric neck supple  Cardiovascular regular rate and rhythm normal S1-S2 no murmurs rubs or gallops, no JVD  Lungs clear bilaterally without wheezing rhonchi tachypnea, adequate air exchange, nonlabored  GI soft nondistended, minimal tenderness palpation that localizes to the epigastrium, negative McMurphy's, bowel sounds present, no rebound or guarding  Vascular intact dorsalis pedis and posterior tibialis without edema  Derm adequate foot hygiene without rashes lesions or skin infections  Musculoskeletal passive range of motion of upper and lower limbs unremarkable, no synovitis or joint effusions  Neuro nonfocal normal speech and cognition strength symmetric in upper and lower limbs sensation grossly intact  Investigations:      Laboratory Testing:  Recent Results (from the past 24 hour(s))   Lactic Acid    Collection Time: 07/01/22  1:40 AM   Result Value Ref Range    Lactic Acid, Whole Blood 1.6 0.7 - 2.1 mmol/L   Lipase    Collection Time: 07/01/22  1:41 AM   Result Value Ref Range    Lipase 111 (H) 13 - 60 U/L   Comprehensive Metabolic Panel w/ Reflex to MG    Collection Time: 07/01/22  1:41 AM   Result Value Ref Range    Glucose 105 (H) 70 - 99 mg/dL    BUN 9 6 - 20 mg/dL    CREATININE 0.66 0.50 - 0.90 mg/dL    Calcium 9.7 8.6 - 10.4 mg/dL    Sodium 138 135 - 144 mmol/L    Potassium 3.3 (L) 3.7 - 5.3 mmol/L    Chloride 99 98 - 107 mmol/L    CO2 26 20 - 31 mmol/L    Anion Gap 13 9 - 17 mmol/L    Alkaline Phosphatase 69 35 - 104 U/L    ALT 11 5 - 33 U/L    AST 14 <32 U/L    Total Bilirubin 0.73 0.3 - 1.2 mg/dL    Total Protein 6.9 6.4 - 8.3 g/dL    Albumin 4.6 3.5 - 5.2 g/dL    Albumin/Globulin Ratio 2.0 1.0 - 2.5    GFR Non-African American >60 >60 mL/min    GFR African American >60 >60 mL/min    GFR Comment         CBC with Auto Differential    Collection Time: 07/01/22  1:41 AM   Result Value Ref Range    WBC 13.5 (H) 3.5 - 11.3 k/uL    RBC 4.66 3.95 - 5.11 m/uL    Hemoglobin 15.1 11.9 - 15.1 g/dL    Hematocrit 44.5 36.3 - 47.1 %    MCV 95.5 82.6 - 102.9 fL    MCH 32.4 25.2 - 33.5 pg    MCHC 33.9 28.4 - 34.8 g/dL    RDW 12.6 11.8 - 14.4 %    Platelets 455 422 - 042 k/uL    MPV 8.7 8.1 - 13.5 fL    NRBC Automated 0.0 0.0 per 100 WBC    Seg Neutrophils 79 (H) 36 - 65 % Lymphocytes 16 (L) 24 - 43 %    Monocytes 5 3 - 12 %    Eosinophils % 0 (L) 1 - 4 %    Basophils 0 0 - 2 %    Immature Granulocytes 0 0 %    Segs Absolute 10.48 (H) 1.50 - 8.10 k/uL    Absolute Lymph # 2.13 1.10 - 3.70 k/uL    Absolute Mono # 0.73 0.10 - 1.20 k/uL    Absolute Eos # 0.04 0.00 - 0.44 k/uL    Basophils Absolute 0.05 0.00 - 0.20 k/uL    Absolute Immature Granulocyte 0.04 0.00 - 0.30 k/uL   Urinalysis with Reflex to Culture    Collection Time: 07/01/22  1:46 AM    Specimen: Urine   Result Value Ref Range    Color, UA Yellow Yellow    Turbidity UA Cloudy (A) Clear    Glucose, Ur NEGATIVE NEGATIVE    Bilirubin Urine NEGATIVE NEGATIVE    Ketones, Urine SMALL (A) NEGATIVE    Specific Gravity, UA 1.023 1.005 - 1.030    Urine Hgb LARGE (A) NEGATIVE    pH, UA 6.0 5.0 - 8.0    Protein, UA 1+ (A) NEGATIVE    Urobilinogen, Urine Normal Normal    Nitrite, Urine NEGATIVE NEGATIVE    Leukocyte Esterase, Urine MODERATE (A) NEGATIVE   Microscopic Urinalysis    Collection Time: 07/01/22  1:46 AM   Result Value Ref Range    -          WBC, UA 50  0 - 5 /HPF    RBC, UA 10 TO 20 0 - 4 /HPF    Epithelial Cells UA 20 TO 50 0 - 5 /HPF    Bacteria, UA MODERATE (A) None       Imaging/Diagnostics:  No results found. Assessment :      Hospital Problems           Last Modified POA    * (Principal) Pancreatitis, unspecified pancreatitis type 7/1/2022 Yes    UTI (urinary tract infection) 7/1/2022 Yes    Intractable vomiting with nausea 7/1/2022 Yes    Marijuana use, continuous 7/1/2022 Yes    Epigastric abdominal pain 7/1/2022 Yes    Hypokalemia 7/1/2022 Yes          Plan:       1. Admit to the hospital service as inpatient  2. Acute abdominal pain with intractable nausea and vomiting with elevated lipase. Differential diagnosis acute gastritis/peptic ulcer disease, hyperemesis cannabis syndrome, acute pancreatitis versus other. Status post IV fluids, antiemetics, pain control.   Check CT abdomen pelvis and gallbladder ultrasound to further evaluate clinical symptoms. Discussed with patient concern for chronic habitual marijuana use, possible hyperemesis cannabis syndrome. Check lipid panel. Continue to follow clinically. GI prophylaxis. start ice chips, consider further dietary advancement as tolerated. Consider consulting GI/surgery if persistent symptoms however has already clinically improved. Replace electrolytes aggressively. 3. UTI with urine incontinence. Possibly also contributing to acute abdominal symptoms. Start empiric IV antibiotics pending culture results. 4. Chronic marijuana use. Discussed with patient concern regarding hyperemesis cannabis syndrome. 5.  History of Nino Key Rendu syndrome with chronic anemia. Hemoglobin stable, possible hemoconcentrated with dehydration. Hold iron supplements for now with GI side effects. Anticipate likely discharge in 2 to 3 days contingent on clinical progress, further work-up. Questions and concerns and treatment plan discussed with patient with nursing present. Plan of care reviewed    Consultations:   IP CONSULT TO HOSPITALIST    Patient is admitted as inpatient status because of co-morbidities listed above, severity of signs and symptoms as outlined, requirement for current medical therapies and most importantly because of direct risk to patient if care not provided in a hospital setting. Expected length of stay > 48 hours.     Ladarius Arevalo MD  7/1/2022  6:39 AM    Copy sent to Dr. Candace Love, APRN - CNP

## 2022-07-01 NOTE — ED PROVIDER NOTES
[facility]  ED  Emergency Department Encounter  Emergency Medicine Resident     Pt Name: Russell Enciso  MRN: [id number]  [place] 1970  Date of evaluation: 7/1/22  PCP:  STACEY Saldana [id number]       Chief Complaint   Patient presents with    Emesis       HISTORY Suzanne  (Location/Symptom, Timing/Onset, Context/Setting, Quality, Duration, Modifying Factors,Severity.)      Russell Enciso is a 50 y.o. female who presents with abdominal pain, nausea vomiting. Symptoms started approximately 5 days ago. Patient has had multiple episodes of nonbilious, nonbloody emesis every day. Patient states that she is filled a gallon bucket each day. Patient states that she has been taking ODT Zofran, which is intermittently helpful, but ultimately her symptoms recur. Patient is unable to tolerate anything p.o., even water. States that she has multiple prolonged episodes of emesis several times per year. This is happened since childhood. Patient does admit to daily cannabis use, but states that these episodes predate her using cannabis. Reports an increased frequency of her nausea and vomiting since menopause started approximately 2 years ago. Patient states she did not initially have abdominal pain, but this occurred after her prolonged and multiple episodes of retching. She denies fever, chills, headache, dizziness, chest pain, shortness of breath, constipation, diarrhea, numbness, tingling, or weakness. PAST MEDICAL / SURGICAL / SOCIAL / FAMILY HISTORY     Denies past medical history     has a past surgical history that includes Tubal ligation.      Social History     Socioeconomic History    Marital status:      Spouse name: Not on file    Number of children: Not on file    Years of education: Not on file    Highest education level: Not on file   Occupational History    Not on file   Tobacco Use    Smoking status: Current Every Day Smoker Packs/day: 1.00    Smokeless tobacco: Never Used   Substance and Sexual Activity    Alcohol use: Yes    Drug use: No    Sexual activity: Yes     Partners: Male     Birth control/protection: Other-see comments     Comment: tubal ligation   Other Topics Concern    Not on file   Social History Narrative    Not on file     Social Determinants of Health     Financial Resource Strain:     Difficulty of Paying Living Expenses: Not on file   Food Insecurity:     Worried About Running Out of Food in the Last Year: Not on file    Britt of Food in the Last Year: Not on file   Transportation Needs:     Lack of Transportation (Medical): Not on file    Lack of Transportation (Non-Medical): Not on file   Physical Activity:     Days of Exercise per Week: Not on file    Minutes of Exercise per Session: Not on file   Stress:     Feeling of Stress : Not on file   Social Connections:     Frequency of Communication with Friends and Family: Not on file    Frequency of Social Gatherings with Friends and Family: Not on file    Attends Spiritism Services: Not on file    Active Member of [address] [place] [address] or Organizations: Not on file    Attends Club or Organization Meetings: Not on file    Marital Status: Not on file   Intimate Partner Violence:     Fear of Current or Ex-Partner: Not on file    Emotionally Abused: Not on file    Physically Abused: Not on file    Sexually Abused: Not on file   Housing Stability:     Unable to Pay for Housing in the Last Year: Not on file    Number of [place] in the Last Year: Not on file    Unstable Housing in the Last Year: Not on file       Family History   Problem Relation Age of Onset    Breast Cancer Sister     Cancer Sister     Other Father         HHT       Allergies:  Penicillins    Home Medications:  Prior to Admission medications    Medication Sig Start Date End Date Taking?  Authorizing Provider   ferrous gluconate 324 (37.5 Fe) MG TABS take 1 tablet by mouth twice a day 11/19/20   Romulo Jameson MD       REVIEW OFSYSTEMS    (2-9 systems for level 4, 10 or more for level 5)      Review of Systems   Constitutional: Negative for chills and fever. HENT: Negative for trouble swallowing. Respiratory: Negative for shortness of breath. Cardiovascular: Negative for chest pain. Gastrointestinal: Positive for abdominal pain, nausea and vomiting. Negative for constipation, diarrhea and rectal pain. Genitourinary: Negative for difficulty urinating, dysuria, hematuria, vaginal bleeding, vaginal discharge and vaginal pain. Musculoskeletal: Negative for back pain. Allergic/Immunologic: Negative for immunocompromised state. Neurological: Negative for weakness. Hematological: Does not bruise/bleed easily. PHYSICAL EXAM   (up to 7 for level 4, 8 or more forlevel 5)      INITIAL VITALS:   ED Triage Vitals   BP Temp Temp src Pulse Resp SpO2 Height Weight   -- -- -- -- -- -- -- --       Physical Exam  Vitals reviewed. Constitutional:       General: She is in acute distress. Appearance: She is well-developed. HENT:      Head: Normocephalic and atraumatic. Mouth/Throat:      Mouth: Mucous membranes are moist.      Pharynx: Oropharynx is clear. Eyes:      Extraocular Movements: Extraocular movements intact. Cardiovascular:      Rate and Rhythm: Normal rate and regular rhythm. Heart sounds: Normal heart sounds. Pulmonary:      Effort: Pulmonary effort is normal.      Breath sounds: Normal breath sounds. Abdominal:      General: Abdomen is flat. Palpations: Abdomen is soft. Tenderness: There is abdominal tenderness. Hernia: No hernia is present. Comments: Mild diffuse tenderness to palpation without guarding, or rebound. Skin:     General: Skin is warm and dry. Capillary Refill: Capillary refill takes less than 2 seconds. Neurological:      General: No focal deficit present.       Mental Status: She is alert and oriented to person, place, and time. Psychiatric:         Mood and Affect: Mood normal.         Behavior: Behavior normal.         DIFFERENTIAL  DIAGNOSIS     PLAN (LABS / IMAGING / EKG):  Orders Placed This Encounter   Procedures    Culture, Urine    CT ABDOMEN PELVIS W IV CONTRAST Additional Contrast? Oral    US ABDOMEN LIMITED Specify organ?  GALLBLADDER, LIVER, PANCREAS    Lipase    Comprehensive Metabolic Panel w/ Reflex to MG    CBC with Auto Differential    Urinalysis with Reflex to Culture    Lactic Acid    Magnesium    Microscopic Urinalysis    Comprehensive Metabolic Panel w/ Reflex to MG    Triglyceride    Lipase    Calcium, Ionized    Magnesium    Phosphorus    Protime-INR    LIPID PANEL    Diet NPO Exceptions are: Ice Chips    Vital signs per unit routine    Notify physician    Up with assistance    Daily weights    Intake and output    Place intermittent pneumatic compression device    Full Code    Inpatient consult to Hospitalist    OT eval and treat    PT evaluation and treat    Initiate Oxygen Therapy Protocol    Pulse Oximetry Spot Check    Insert peripheral IV    ADMIT TO INPATIENT       MEDICATIONS ORDERED:  Orders Placed This Encounter   Medications    lactated ringers bolus    haloperidol lactate (HALDOL) injection 5 mg    diphenhydrAMINE (BENADRYL) injection 25 mg    potassium chloride 10 mEq/100 mL IVPB (Peripheral Line)    lactated ringers bolus    ferrous gluconate 324 (37.5 Fe) MG tablet 324 mg    0.9 % sodium chloride infusion    sodium chloride flush 0.9 % injection 5-40 mL    sodium chloride flush 0.9 % injection 5-40 mL    0.9 % sodium chloride infusion    potassium chloride 10 mEq/100 mL IVPB (Peripheral Line)    magnesium sulfate 1000 mg in dextrose 5% 100 mL IVPB    OR Linked Order Group     HYDROmorphone (DILAUDID) injection 0.25 mg     HYDROmorphone (DILAUDID) injection 0.5 mg    OR Linked Order Group     ondansetron (ZOFRAN-ODT) disintegrating tablet 4 mg     ondansetron (ZOFRAN) injection 4 mg    enoxaparin (LOVENOX) injection 40 mg     Order Specific Question:   Indication of Use     Answer:   Prophylaxis-DVT/PE    cefTRIAXone (ROCEPHIN) 1000 mg IVPB in NS 50ml minibag     Order Specific Question:   Antimicrobial Indications     Answer:   Urinary Tract Infection       DDX: Gastroenteritis, acute pancreatitis, cyclic vomiting, cannabis hyperemesis syndrome, pyelonephritis, acute cystitis    Initial MDM/Plan: 50 y.o. female who presents with 5 days of intractable nausea and vomiting. Only transiently responsive to Zofran. Will obtain CMP, lipase, CBC, lactate and urinalysis. Will treat symptoms with Haldol and Benadryl. Additional work-up and treatment pending the results of the above listed studies.     DIAGNOSTIC RESULTS / EMERGENCYDEPARTMENT COURSE / MDM     LABS:  Labs Reviewed   LIPASE - Abnormal; Notable for the following components:       Result Value    Lipase 111 (*)     All other components within normal limits   COMPREHENSIVE METABOLIC PANEL W/ REFLEX TO MG FOR LOW K - Abnormal; Notable for the following components:    Glucose 105 (*)     Potassium 3.3 (*)     All other components within normal limits   CBC WITH AUTO DIFFERENTIAL - Abnormal; Notable for the following components:    WBC 13.5 (*)     Seg Neutrophils 79 (*)     Lymphocytes 16 (*)     Eosinophils % 0 (*)     Segs Absolute 10.48 (*)     All other components within normal limits   URINALYSIS WITH REFLEX TO CULTURE - Abnormal; Notable for the following components:    Turbidity UA Cloudy (*)     Ketones, Urine SMALL (*)     Urine Hgb LARGE (*)     Protein, UA 1+ (*)     Leukocyte Esterase, Urine MODERATE (*)     All other components within normal limits   MICROSCOPIC URINALYSIS - Abnormal; Notable for the following components:    Bacteria, UA MODERATE (*)     All other components within normal limits   CULTURE, URINE   LACTIC ACID   MAGNESIUM   LIPID PANEL RADIOLOGY:  No results found. EMERGENCY DEPARTMENT COURSE:    Patient does have elevated lipase consistent with acute pancreatitis. Also has mild hypokalemia. Will replete peripherally. Also has UTI, so will give dose of Rocephin. Plan for admission to [facility]. PROCEDURES:  None    CONSULTS:  IP CONSULT TO HOSPITALIST    CRITICAL CARE:  Please see attending note    FINAL IMPRESSION      1. Intractable vomiting with nausea, unspecified vomiting type    2. Acute pancreatitis, unspecified complication status, unspecified pancreatitis type    3. Hypokalemia    4. Acute cystitis with hematuria          DISPOSITION / PLAN     DISPOSITION        PATIENT REFERRED TO:  No follow-up provider specified.     DISCHARGE MEDICATIONS:  Current Discharge Medication List          Maxi Echevarria MD  Emergency Medicine Resident    (Please note that portions of this note were completed with a voice recognition program.Efforts were made to edit the dictations but occasionally words are mis-transcribed.)        Maxi Echevarria MD  Resident  07/01/22 [id number]

## 2022-07-02 VITALS
SYSTOLIC BLOOD PRESSURE: 138 MMHG | HEIGHT: 66 IN | RESPIRATION RATE: 14 BRPM | HEART RATE: 52 BPM | WEIGHT: 115 LBS | DIASTOLIC BLOOD PRESSURE: 56 MMHG | OXYGEN SATURATION: 98 % | BODY MASS INDEX: 18.48 KG/M2 | TEMPERATURE: 98.2 F

## 2022-07-02 PROBLEM — R10.9 ABDOMINAL PAIN: Status: ACTIVE | Noted: 2022-07-02

## 2022-07-02 LAB
ALBUMIN SERPL-MCNC: 3.8 G/DL (ref 3.5–5.2)
ALBUMIN/GLOBULIN RATIO: 1.9 (ref 1–2.5)
ALP BLD-CCNC: 56 U/L (ref 35–104)
ALT SERPL-CCNC: 9 U/L (ref 5–33)
ANION GAP SERPL CALCULATED.3IONS-SCNC: 12 MMOL/L (ref 9–17)
AST SERPL-CCNC: 11 U/L
BILIRUB SERPL-MCNC: 0.45 MG/DL (ref 0.3–1.2)
BUN BLDV-MCNC: 5 MG/DL (ref 6–20)
CALCIUM IONIZED: 1.11 MMOL/L (ref 1.13–1.33)
CALCIUM SERPL-MCNC: 8.9 MG/DL (ref 8.6–10.4)
CHLORIDE BLD-SCNC: 103 MMOL/L (ref 98–107)
CO2: 23 MMOL/L (ref 20–31)
CREAT SERPL-MCNC: 0.51 MG/DL (ref 0.5–0.9)
CULTURE: NORMAL
GFR AFRICAN AMERICAN: >60 ML/MIN
GFR NON-AFRICAN AMERICAN: >60 ML/MIN
GFR SERPL CREATININE-BSD FRML MDRD: ABNORMAL ML/MIN/{1.73_M2}
GLUCOSE BLD-MCNC: 91 MG/DL (ref 70–99)
INR BLD: 1
LIPASE: 19 U/L (ref 13–60)
MAGNESIUM: 1.7 MG/DL (ref 1.6–2.6)
PHOSPHORUS: 4 MG/DL (ref 2.6–4.5)
POTASSIUM SERPL-SCNC: 3.7 MMOL/L (ref 3.7–5.3)
PROTHROMBIN TIME: 10.6 SEC (ref 9.1–12.3)
SODIUM BLD-SCNC: 138 MMOL/L (ref 135–144)
SPECIMEN DESCRIPTION: NORMAL
TOTAL PROTEIN: 5.8 G/DL (ref 6.4–8.3)
TRIGL SERPL-MCNC: 58 MG/DL

## 2022-07-02 PROCEDURE — 83735 ASSAY OF MAGNESIUM: CPT

## 2022-07-02 PROCEDURE — 36415 COLL VENOUS BLD VENIPUNCTURE: CPT

## 2022-07-02 PROCEDURE — 85610 PROTHROMBIN TIME: CPT

## 2022-07-02 PROCEDURE — 84100 ASSAY OF PHOSPHORUS: CPT

## 2022-07-02 PROCEDURE — 80053 COMPREHEN METABOLIC PANEL: CPT

## 2022-07-02 PROCEDURE — G0378 HOSPITAL OBSERVATION PER HR: HCPCS

## 2022-07-02 PROCEDURE — 84478 ASSAY OF TRIGLYCERIDES: CPT

## 2022-07-02 PROCEDURE — 83690 ASSAY OF LIPASE: CPT

## 2022-07-02 PROCEDURE — 6360000002 HC RX W HCPCS: Performed by: INTERNAL MEDICINE

## 2022-07-02 PROCEDURE — 99217 PR OBSERVATION CARE DISCHARGE MANAGEMENT: CPT | Performed by: FAMILY MEDICINE

## 2022-07-02 PROCEDURE — 82330 ASSAY OF CALCIUM: CPT

## 2022-07-02 PROCEDURE — 2500000003 HC RX 250 WO HCPCS: Performed by: INTERNAL MEDICINE

## 2022-07-02 RX ORDER — NITROFURANTOIN 25; 75 MG/1; MG/1
100 CAPSULE ORAL 2 TIMES DAILY
Qty: 20 CAPSULE | Refills: 0 | Status: SHIPPED | OUTPATIENT
Start: 2022-07-02 | End: 2022-07-12

## 2022-07-02 RX ADMIN — CEFTRIAXONE SODIUM 1000 MG: 1 INJECTION, POWDER, FOR SOLUTION INTRAMUSCULAR; INTRAVENOUS at 06:23

## 2022-07-02 NOTE — PLAN OF CARE
Problem: Discharge Planning  Goal: Discharge to home or other facility with appropriate resources  7/2/2022 0309 by Anurag Syed RN  Outcome: Progressing     Problem: Safety - Adult  Goal: Free from fall injury  7/2/2022 0309 by Anurag Syed RN  Outcome: Progressing     Problem: Pain  Goal: Verbalizes/displays adequate comfort level or baseline comfort level  Outcome: Progressing

## 2022-07-02 NOTE — DISCHARGE SUMMARY
[facility]  Office: [phone]  Gale Jernigan, DO, Augusta Alberto, DO, Jelly Dotson DO, [facility] Blood, DO, John Corona MD, Rachael Myers MD, Ziggy Wynne MD, Shaq Joseph MD, Ely Jason MD, Jana Ayala MD, Naila Gant MD, Susanna Mortensen, DO, Jennifer Ramos MD,  Jonathan Mohan DO, Yaneth Dewey MD, Dottie Shaw MD, Ghada Bull, DO, Lluvia Funes MD, Yesica Porter MD, Jaiden Serrano DO, Ryan Monetlongo MD, Whitley Marina MD, Tawana Rivera, [facility], [facility], CNP, Georgette Ham, CNP, Luz Maria Lopez, CNP, Magdalene Murguia, CNP, Anna Eddy, CNP, Rafael Girard PA-C, Asaf Dorantes, [facility], Sugey Quick, CNP, Juan Johnston, CNP, Quique Alvarado, CNP, Sapphire Manzo, CNS, Hiram Rodriguez, [facility], Daisy Hilario, CNP, Kathy Morin, [facility], [facility], [facility]    Discharge Summary     Patient ID: Christopher Wilson  :  1970   MRN: [id number]     ACCOUNT:  [de-identified]   Patient's PCP: STACEY Prieto CNP  Admit Date: 2022   Discharge Date: 2022      Length of Stay: 1  Code Status:  Full Code  Admitting Physician: No admitting provider for patient encounter. Discharge Physician: Whitley Marina MD     Active Discharge Diagnoses:     Hospital Problem Lists:  Principal Problem:    Pancreatitis, unspecified pancreatitis type  Active Problems:    UTI (urinary tract infection)    Intractable vomiting with nausea    Marijuana use, continuous    Epigastric abdominal pain    Hypokalemia  Resolved Problems:    * No resolved hospital problems.  *      Admission Condition:  stable     Discharged Condition: stable  History of Present Illness:      Christopher Wlison is a 50 y.o.  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     Patient describes abrupt onset of symptoms after eating [employer] fries on Monday night/overnight. States she awoke with acute severe epigastric abdominal pain with \"like a soreness or bruising\" with cramping with intractable nausea and vomiting. Patient's had multiple episodes of emesis for the past several days with poor p.o. intake. She denies any hematemesis. She denies diarrhea constipation. She has noted increasing intermittent indigestion over the past week. She denies any NSAID use. She denies any prior issues with pancreatitis or abdominal symptoms. She presented with as her symptoms continue to persist without improvement. However her symptoms have improved after she got IV fluids in the ED with antiemetics and she states she feels \"a lot better now\". Denies fevers or chills.       Patient does admit to a recent fall approximately 1 week ago where she fell hard, was mechanical trip and fall after she slipped on wet floor that she was mopping. She landed on her back. She denies any alcohol use. She did have episodic urinary urgency with incontinence but thought it was related to excessive vomiting. Denies dysuria hematuria. Denies flank pain back pain. denies prior issues with pancreatitis. Denies prior episodic abdominal pain or problems with indigestion prior to this week. Denies sick contacts. Denies NSAID use. She did take some Tylenol PM to help reduce her abdominal symptoms with modest improvement.         Hospital Stay:     Hospital Course:  Scarlet Granado is a 50 y.o. female who was admitted for the management of    Pancreatitis, unspecified pancreatitis type , presented to ER with   Emesis    Pt was seen and examined on day of d/c  Doing well   Tolerating diet  Denies having any complaints  Imaging completed, see results below        Significant therapeutic interventions:      Significant Diagnostic Studies:   Labs / Micro:  CBC:   Lab Results   Component Value Date/Time WBC 13.5 07/01/2022 01:41 AM    RBC 4.66 07/01/2022 01:41 AM    HGB 15.1 07/01/2022 01:41 AM    HCT 44.5 07/01/2022 01:41 AM    MCV 95.5 07/01/2022 01:41 AM    MCH 32.4 07/01/2022 01:41 AM    MCHC 33.9 07/01/2022 01:41 AM    RDW 12.6 07/01/2022 01:41 AM     07/01/2022 01:41 AM     BMP:    Lab Results   Component Value Date/Time    GLUCOSE 91 07/02/2022 06:31 AM     07/02/2022 06:31 AM    K 3.7 07/02/2022 06:31 AM     07/02/2022 06:31 AM    CO2 23 07/02/2022 06:31 AM    ANIONGAP 12 07/02/2022 06:31 AM    BUN 5 07/02/2022 06:31 AM    CREATININE 0.51 07/02/2022 06:31 AM    BUNCRER NOT REPORTED 10/15/2020 09:15 AM    CALCIUM 8.9 07/02/2022 06:31 AM    LABGLOM >60 07/02/2022 06:31 AM    GFRAA >60 07/02/2022 06:31 AM    GFR      07/02/2022 06:31 AM     HFP:    Lab Results   Component Value Date/Time    PROT 5.8 07/02/2022 06:31 AM     CMP:    Lab Results   Component Value Date/Time    GLUCOSE 91 07/02/2022 06:31 AM     07/02/2022 06:31 AM    K 3.7 07/02/2022 06:31 AM     07/02/2022 06:31 AM    CO2 23 07/02/2022 06:31 AM    BUN 5 07/02/2022 06:31 AM    CREATININE 0.51 07/02/2022 06:31 AM    ANIONGAP 12 07/02/2022 06:31 AM    ALKPHOS 56 07/02/2022 06:31 AM    ALT 9 07/02/2022 06:31 AM    AST 11 07/02/2022 06:31 AM    BILITOT 0.45 07/02/2022 06:31 AM    LABALBU 3.8 07/02/2022 06:31 AM    ALBUMIN 1.9 07/02/2022 06:31 AM    LABGLOM >60 07/02/2022 06:31 AM    GFRAA >60 07/02/2022 06:31 AM    GFR      07/02/2022 06:31 AM    PROT 5.8 07/02/2022 06:31 AM    CALCIUM 8.9 07/02/2022 06:31 AM     PT/INR:    Lab Results   Component Value Date/Time    PROTIME 10.6 07/02/2022 06:31 AM    INR 1.0 07/02/2022 06:31 AM     PTT: No results found for: APTT  FLP:    Lab Results   Component Value Date/Time    CHOL 215 07/01/2022 01:41 AM    TRIG 58 07/02/2022 06:31 AM    HDL 52 07/01/2022 01:41 AM     U/A:    Lab Results   Component Value Date/Time    COLORU Yellow 07/01/2022 01:46 AM    TURBIDITY Cloudy 07/01/2022 01:46 AM    SPECGRAV 1.023 07/01/2022 01:46 AM    HGBUR LARGE 07/01/2022 01:46 AM    PHUR 6.0 07/01/2022 01:46 AM    PROTEINU 1+ 07/01/2022 01:46 AM    GLUCOSEU NEGATIVE 07/01/2022 01:46 AM    KETUA SMALL 07/01/2022 01:46 AM    BILIRUBINUR NEGATIVE 07/01/2022 01:46 AM    BILIRUBINUR negative 05/08/2017 03:04 PM    UROBILINOGEN Normal 07/01/2022 01:46 AM    NITRU NEGATIVE 07/01/2022 01:46 AM    LEUKOCYTESUR MODERATE 07/01/2022 01:46 AM     TSH:    Lab Results   Component Value Date/Time    TSH 1.91 05/10/2017 07:30 AM         Radiology:  CT ABDOMEN PELVIS W WO CONTRAST Additional Contrast? Oral    Result Date: 7/1/2022  1. No evidence of pancreatitis or other acute findings. 2.  Small amount of free fluid in the pelvis is nonspecific and may be physiologic. US ABDOMEN LIMITED Specify organ? GALLBLADDER, LIVER, PANCREAS    Result Date: 7/1/2022  Unremarkable gallbladder ultrasound. Suspect mild hepatic steatosis. Consultations:    Consults:     Final Specialist Recommendations/Findings:   IP CONSULT TO HOSPITALIST      The patient was seen and examined on day of discharge and this discharge summary is in conjunction with any daily progress note from day of discharge.     Discharge plan:     Disposition: Home    Physician Follow Up:      STACEY Metcalf - CNP  [address]  [phone]             Requiring Further Evaluation/Follow Up POST HOSPITALIZATION/Incidental Findings:      Diet: cardiac diet    Activity: As tolerated     Instructions to Patient:      Discharge Medications:      Medication List      START taking these medications    nitrofurantoin (macrocrystal-monohydrate) 100 MG capsule  Commonly known as: MACROBID  Take 1 capsule by mouth 2 times daily for 10 days        CONTINUE taking these medications    ferrous gluconate 324 (37.5 Fe) MG Tabs  take 1 tablet by mouth twice a day           Where to Get Your Medications      These medications were sent to [facility] [address] [facility] [address]  [address], [address] [place] [address]    Phone: [phone]   · nitrofurantoin (macrocrystal-monohydrate) 100 MG capsule         No discharge procedures on file. Time Spent on discharge is  34 mins in patient examination, evaluation, counseling as well as medication reconciliation, prescriptions for required medications, discharge plan and follow up. Electronically signed by   Valeria Anaya MD  7/2/2022  7:21 AM      Thank you Dr. Max Ospina, APRN - CNP for the opportunity to be involved in this patient's care.

## 2022-07-02 NOTE — PROGRESS NOTES
CLINICAL PHARMACY NOTE: MEDS TO BEDS    Total # of Prescriptions Filled: 1   The following medications were delivered to the patient:  · nitrofurantoin    Additional Documentation:  Delivered to patient and one guest outside of room at 11:25am. Paid $10 copay on [place].  HR

## 2022-07-02 NOTE — PLAN OF CARE
Problem: Discharge Planning  Goal: Discharge to home or other facility with appropriate resources  7/2/2022 1006 by Ramon Cochran RN  Outcome: Completed  7/2/2022 0309 by Bonnie Boswell RN  Outcome: Progressing     Problem: Safety - Adult  Goal: Free from fall injury  7/2/2022 1006 by Ramon Cochran RN  Outcome: Completed  Flowsheets (Taken 7/2/2022 0724)  Free From Fall Injury: Instruct family/caregiver on patient safety  7/2/2022 0309 by Bonnie Boswell RN  Outcome: Progressing     Problem: Pain  Goal: Verbalizes/displays adequate comfort level or baseline comfort level  7/2/2022 1006 by Ramon Cochran RN  Outcome: Completed  7/2/2022 0309 by Bonnie Boswell RN  Outcome: Progressing

## 2022-07-12 ENCOUNTER — OFFICE VISIT (OUTPATIENT)
Dept: PRIMARY CARE CLINIC | Age: 52
End: 2022-07-12
Payer: COMMERCIAL

## 2022-07-12 VITALS
TEMPERATURE: 98.2 F | OXYGEN SATURATION: 96 % | WEIGHT: 121 LBS | DIASTOLIC BLOOD PRESSURE: 74 MMHG | SYSTOLIC BLOOD PRESSURE: 116 MMHG | HEART RATE: 76 BPM | BODY MASS INDEX: 19.53 KG/M2

## 2022-07-12 DIAGNOSIS — R11.2 NON-INTRACTABLE VOMITING WITH NAUSEA, UNSPECIFIED VOMITING TYPE: Primary | ICD-10-CM

## 2022-07-12 DIAGNOSIS — Z12.31 ENCOUNTER FOR SCREENING MAMMOGRAM FOR BREAST CANCER: ICD-10-CM

## 2022-07-12 DIAGNOSIS — R10.84 GENERALIZED ABDOMINAL PAIN: ICD-10-CM

## 2022-07-12 PROCEDURE — 99214 OFFICE O/P EST MOD 30 MIN: CPT | Performed by: NURSE PRACTITIONER

## 2022-07-12 RX ORDER — ONDANSETRON 4 MG/1
4 TABLET, ORALLY DISINTEGRATING ORAL EVERY 8 HOURS PRN
Qty: 20 TABLET | Refills: 2 | Status: SHIPPED | OUTPATIENT
Start: 2022-07-12

## 2022-07-12 SDOH — ECONOMIC STABILITY: FOOD INSECURITY: WITHIN THE PAST 12 MONTHS, THE FOOD YOU BOUGHT JUST DIDN'T LAST AND YOU DIDN'T HAVE MONEY TO GET MORE.: NEVER TRUE

## 2022-07-12 SDOH — ECONOMIC STABILITY: FOOD INSECURITY: WITHIN THE PAST 12 MONTHS, YOU WORRIED THAT YOUR FOOD WOULD RUN OUT BEFORE YOU GOT MONEY TO BUY MORE.: NEVER TRUE

## 2022-07-12 SDOH — ECONOMIC STABILITY: TRANSPORTATION INSECURITY
IN THE PAST 12 MONTHS, HAS LACK OF TRANSPORTATION KEPT YOU FROM MEETINGS, WORK, OR FROM GETTING THINGS NEEDED FOR DAILY LIVING?: NO

## 2022-07-12 SDOH — ECONOMIC STABILITY: TRANSPORTATION INSECURITY
IN THE PAST 12 MONTHS, HAS THE LACK OF TRANSPORTATION KEPT YOU FROM MEDICAL APPOINTMENTS OR FROM GETTING MEDICATIONS?: NO

## 2022-07-12 ASSESSMENT — PATIENT HEALTH QUESTIONNAIRE - PHQ9
1. LITTLE INTEREST OR PLEASURE IN DOING THINGS: 0
2. FEELING DOWN, DEPRESSED OR HOPELESS: 0
SUM OF ALL RESPONSES TO PHQ QUESTIONS 1-9: 0
SUM OF ALL RESPONSES TO PHQ9 QUESTIONS 1 & 2: 0

## 2022-07-12 ASSESSMENT — ENCOUNTER SYMPTOMS
SHORTNESS OF BREATH: 0
TROUBLE SWALLOWING: 0
ABDOMINAL PAIN: 1
NAUSEA: 1
DIARRHEA: 0
WHEEZING: 0
VOMITING: 1

## 2022-07-12 ASSESSMENT — SOCIAL DETERMINANTS OF HEALTH (SDOH): HOW HARD IS IT FOR YOU TO PAY FOR THE VERY BASICS LIKE FOOD, HOUSING, MEDICAL CARE, AND HEATING?: NOT HARD AT ALL

## 2022-07-12 NOTE — LETTER
[facility]  [facility] [address] [place] [address]  Phone: [phone]  Fax: [address], STACEY - CNP        July 12, 2022     Patient: Prosper Candelaria   YOB: 1970   Date of Visit: 7/12/2022       To Whom It May Concern: It is my medical opinion that Dianna Blas may return to work on 7/15/22. If you have any questions or concerns, please don't hesitate to call.     Sincerely,        STACEY Esquivel CNP

## 2022-07-12 NOTE — PROGRESS NOTES
Visit Information    Have you changed or started any medications since your last visit including any over-the-counter medicines, vitamins, or herbal medicines? yes -   Are you having any side effects from any of your medications? -  no  Have you stopped taking any of your medications? Is so, why? -  no    Have you seen any other physician or provider since your last visit? Yes - Records Obtained  Have you had any other diagnostic tests since your last visit? Yes - Records Obtained  Have you been seen in the emergency room and/or had an admission to a hospital since we last saw you? Yes - Records Obtained  Have you had your routine dental cleaning in the past 6 months? no    Have you activated your [employer] account? If not, what are your barriers?  No, Declined    Patient Care Team:  STACEY Alejandra CNP as PCP - General (Family Medicine)  STACEY Alejandra CNP as PCP - [facility] Provider    Medical History Review  Past Medical, Family, and Social History reviewed and does not contribute to the patient presenting condition    Health Maintenance   Topic Date Due    COVID-19 Vaccine (1) Never done    Pneumococcal 0-64 years Vaccine (1 - PCV) Never done    Depression Screen  Never done    HIV screen  Never done    Hepatitis C screen  Never done    DTaP/Tdap/Td vaccine (1 - Tdap) Never done    Breast cancer screen  Never done    Colorectal Cancer Screen  Never done    Shingles vaccine (1 of 2) Never done    Flu vaccine (1) 09/01/2022    Cervical cancer screen  09/10/2025    Lipids  07/01/2027    Hepatitis A vaccine  Aged Out    Hepatitis B vaccine  Aged Out    Hib vaccine  Aged Out    Meningococcal (ACWY) vaccine  Aged Out

## 2022-07-28 ENCOUNTER — HOSPITAL ENCOUNTER (OUTPATIENT)
Dept: MAMMOGRAPHY | Age: 52
Discharge: HOME | End: 2022-07-30
Payer: COMMERCIAL

## 2022-07-28 ENCOUNTER — HOSPITAL ENCOUNTER (OUTPATIENT)
Dept: NUCLEAR MEDICINE | Age: 52
Discharge: HOME | End: 2022-07-30
Payer: COMMERCIAL

## 2022-07-28 DIAGNOSIS — R11.2 NON-INTRACTABLE VOMITING WITH NAUSEA, UNSPECIFIED VOMITING TYPE: ICD-10-CM

## 2022-07-28 DIAGNOSIS — Z12.31 ENCOUNTER FOR SCREENING MAMMOGRAM FOR BREAST CANCER: ICD-10-CM

## 2022-07-28 PROCEDURE — 77063 BREAST TOMOSYNTHESIS BI: CPT

## 2022-07-28 PROCEDURE — 78227 HEPATOBIL SYST IMAGE W/DRUG: CPT

## 2022-07-28 PROCEDURE — 3430000000 HC RX DIAGNOSTIC RADIOPHARMACEUTICAL: Performed by: NURSE PRACTITIONER

## 2022-07-28 PROCEDURE — A9537 TC99M MEBROFENIN: HCPCS | Performed by: NURSE PRACTITIONER

## 2022-07-28 RX ADMIN — Medication 5.7 MILLICURIE: at 08:00

## 2022-07-31 PROBLEM — N39.0 UTI (URINARY TRACT INFECTION): Status: RESOLVED | Noted: 2022-07-01 | Resolved: 2022-07-31

## 2022-08-02 ENCOUNTER — TELEPHONE (OUTPATIENT)
Dept: PRIMARY CARE CLINIC | Age: 52
End: 2022-08-02

## 2022-08-02 DIAGNOSIS — K82.8 DYSFUNCTIONAL GALLBLADDER: Primary | ICD-10-CM

## 2022-08-02 NOTE — TELEPHONE ENCOUNTER
Patient called regarding mammogram and HIDA screen and was read PCP's results. Pt expressed understanding about reaching out to a general surgeon regarding her gallbladder and wanted the contact information for a gallbladder surgeon in [facility].   If she needs gallbladder surgery, she wanted to know if it would be performed at [facility].

## 2022-08-25 NOTE — TELEPHONE ENCOUNTER
Please notify the patient that I did place the referral to general surgery, Dr. Stanislaw aBxter, in \[facility]\"". Provide her with contact information to schedule her appointment.   It will be up to the surgeon as to what location he performs the procedures

## 2022-08-26 ENCOUNTER — TELEPHONE (OUTPATIENT)
Dept: SURGERY | Age: 52
End: 2022-08-26

## 2022-09-22 ENCOUNTER — OFFICE VISIT (OUTPATIENT)
Dept: PRIMARY CARE CLINIC | Age: 52
End: 2022-09-22
Payer: COMMERCIAL

## 2022-09-22 VITALS
WEIGHT: 123 LBS | TEMPERATURE: 97.6 F | DIASTOLIC BLOOD PRESSURE: 66 MMHG | BODY MASS INDEX: 19.85 KG/M2 | SYSTOLIC BLOOD PRESSURE: 109 MMHG | HEART RATE: 73 BPM | OXYGEN SATURATION: 95 %

## 2022-09-22 DIAGNOSIS — K82.8 DYSFUNCTIONAL GALLBLADDER: ICD-10-CM

## 2022-09-22 DIAGNOSIS — R11.2 NON-INTRACTABLE VOMITING WITH NAUSEA, UNSPECIFIED VOMITING TYPE: ICD-10-CM

## 2022-09-22 DIAGNOSIS — Z09 HOSPITAL DISCHARGE FOLLOW-UP: Primary | ICD-10-CM

## 2022-09-22 PROCEDURE — 1111F DSCHRG MED/CURRENT MED MERGE: CPT | Performed by: NURSE PRACTITIONER

## 2022-09-22 PROCEDURE — 99214 OFFICE O/P EST MOD 30 MIN: CPT | Performed by: NURSE PRACTITIONER

## 2022-09-22 RX ORDER — PANTOPRAZOLE SODIUM 40 MG/1
40 TABLET, DELAYED RELEASE ORAL DAILY
Qty: 30 TABLET | Refills: 0 | Status: SHIPPED | OUTPATIENT
Start: 2022-09-22

## 2022-09-22 NOTE — PROGRESS NOTES
Visit Information    Have you changed or started any medications since your last visit including any over-the-counter medicines, vitamins, or herbal medicines? yes - multi vitamin   Are you having any side effects from any of your medications? -  no  Have you stopped taking any of your medications? Is so, why? -  no    Have you seen any other physician or provider since your last visit? No  Have you had any other diagnostic tests since your last visit? No  Have you been seen in the emergency room and/or had an admission to a hospital since we last saw you? Yes - Records Obtained  Have you had your routine dental cleaning in the past 6 months? no    Have you activated your [email or web address] account? If not, what are your barriers?  No:      Patient Care Team:  STACEY Salgado CNP as PCP - General (Family Medicine)  STACEY Salgado CNP as PCP - [facility]    Medical History Review  Past Medical, Family, and Social History reviewed and does not contribute to the patient presenting condition    Health Maintenance   Topic Date Due    COVID-19 Vaccine (1) Never done    Pneumococcal 0-64 years Vaccine (1 - PCV) Never done    HIV screen  Never done    Hepatitis C screen  Never done    DTaP/Tdap/Td vaccine (1 - Tdap) Never done    Colorectal Cancer Screen  Never done    Shingles vaccine (1 of 2) Never done    Flu vaccine (1) Never done    Depression Screen  07/12/2023    Breast cancer screen  07/28/2023    Cervical cancer screen  09/10/2025    Lipids  07/01/2027    Hepatitis A vaccine  Aged Out    Hepatitis B vaccine  Aged Out    Hib vaccine  Aged Out    Meningococcal (ACWY) vaccine  Aged Out

## 2022-09-22 NOTE — PROGRESS NOTES
Post-Discharge Transitional Care Management Progress Note      Rebel Rockwell   YOB: 1970    Date of Office Visit:  9/22/2022  Date of emergency room admission: 8/27/22      Care management risk score Rising risk (score 2-5) and Complex Care (Scores >=6): No Risk Score On File     Non face to face  following discharge, date last encounter closed (first attempt may have been earlier): *No documented post hospital discharge outreach found in the last 14 days *No documented post hospital discharge outreach found in the last 14 days    Call initiated 2 business days of discharge: *No response recorded in the last 14 days    ASSESSMENT/PLAN:   Hospital discharge follow-up  -     [place] DISCHARGE MEDS RECONCILED W/ CURRENT OUTPATIENT MED LIST  Dysfunctional gallbladder  -     pantoprazole (PROTONIX) 40 MG tablet; Take 1 tablet by mouth daily, Disp-30 tablet, R-0Normal  Non-intractable vomiting with nausea, unspecified vomiting type  -     Celiac Disease Panel; Future  -     Basic Metabolic Panel, Fasting; Future    Provided patient with number to contact general surgery, advised to call today to schedule for gallbladder dysfunction. Repeat labs to evaluate electrolyte levels. Persistent symptoms, will start PPI for possible acid reflux and also evaluate for celiac disease. Medical Decision Making: low complexity  Return for Follow up after labs resulted. Subjective:   HPI:  Follow up of Hospital problems/diagnosis(es): Was in [place] and could not hold food down for 2 days, went to the ER. Treated for dehydration, along with potassium. Ddi scans of her stomach, nothing seen on scans. IS occurring when she goes to bed at night. She initially starts to get hold, then she starts to feel weak and dry heaves, no vomiting. Gets lightheaded and often cannot eat for 2 days. Saw a nutritionist in her 23, did do some food elimination. Currently now eats mostly fruits and veggies, not much meat.  Also drink water or smart water    Has not had an attack in the last month    Ongoing all her life, she states many members of her family including her grandson and son also appear to have similar episodes    Inpatient course: Discharge summary reviewed- see chart. Patient Active Problem List   Diagnosis    Intractable abdominal pain    Right lower quadrant abdominal pain    Pancreatitis, unspecified pancreatitis type    Intractable vomiting with nausea    Marijuana use, continuous    Epigastric abdominal pain    Hypokalemia    Intractable vomiting    Abdominal pain       Medications listed as ordered at the time of discharge from hospital     Medication List            Accurate as of September 22, 2022  7:41 AM. If you have any questions, ask your nurse or doctor.                 START taking these medications      pantoprazole 40 MG tablet  Commonly known as: PROTONIX  Take 1 tablet by mouth daily  Started by: TSACEY Metcalf CNP            CONTINUE taking these medications      ferrous gluconate 324 (37.5 Fe) MG Tabs  take 1 tablet by mouth twice a day     ondansetron 4 MG disintegrating tablet  Commonly known as: Zofran ODT  Take 1 tablet by mouth every 8 hours as needed for Nausea or Vomiting               Where to Get Your Medications        These medications were sent to [address] [place] [address], [address] [address]      Phone: [phone]   pantoprazole 40 MG tablet           Medications marked \"taking\" at this time  Outpatient Medications Marked as Taking for the 9/22/22 encounter (Office Visit) with STACEY Metcalf CNP   Medication Sig Dispense Refill    pantoprazole (PROTONIX) 40 MG tablet Take 1 tablet by mouth daily 30 tablet 0    ondansetron (ZOFRAN ODT) 4 MG disintegrating tablet Take 1 tablet by mouth every 8 hours as needed for Nausea or Vomiting 20 tablet 2    ferrous gluconate 324 (37.5 Fe) MG TABS take 1 tablet by mouth twice a day 60 tablet 3        Medications patient taking as of now reconciled against medications ordered at time of hospital discharge: Yes    A comprehensive review of systems was negative except for what was noted in the HPI. Objective:    /66   Pulse 73   Temp 97.6 °F (36.4 °C) (Temporal)   Wt 123 lb (55.8 kg)   SpO2 95%   BMI 19.85 kg/m²   General Appearance: alert and oriented to person, place and time, well-developed and well-nourished, in no acute distress  Skin: warm and dry, no rash or erythema  Eyes: pupils equal, round, and reactive to light, extraocular eye movements intact, conjunctivae normal  Cardiovascular: normal rate and normal S1 and S2  Abd: Abdomen soft, nontender     An electronic signature was used to authenticate this note.   --STACEY Johnston - CNP